# Patient Record
Sex: FEMALE | Race: WHITE | Employment: UNEMPLOYED | URBAN - METROPOLITAN AREA
[De-identification: names, ages, dates, MRNs, and addresses within clinical notes are randomized per-mention and may not be internally consistent; named-entity substitution may affect disease eponyms.]

---

## 2017-01-26 NOTE — PROGRESS NOTES
Here in follow-up for attention deficit disorder. Adderall working well. Denies palpitations or poor appetite. She is working on losing weight. Congratulated her on 15 pounds of weight loss in the last 16 months.   She also needs a refill of amitriptyli °C) (Oral)  Resp 16  Ht 68.5\"  Wt 174 lb 9.6 oz  BMI 26.16 kg/m2  SpO2 99%  LMP 01/02/2017 (Exact Date)    General thin female no acute distress. Heart regular rhythm normal S1-S2 no murmurs lifts or gallops.     Assessment ADD #2 dysthymia    Plans refil

## 2017-05-12 NOTE — PROGRESS NOTES
Here for follow-up of ADD. She feels like the medicine is not working as well as it once did. She is interested in increasing the dose. She denies heart palpitations, insomnia, or poor appetite. Her periods have been irregular the last few months.   A

## 2017-06-06 ENCOUNTER — PATIENT MESSAGE (OUTPATIENT)
Dept: FAMILY MEDICINE CLINIC | Facility: CLINIC | Age: 47
End: 2017-06-06

## 2017-06-06 NOTE — TELEPHONE ENCOUNTER
From: Ginger Bosch  To: Usha Shen MD  Sent: 6/6/2017 9:04 AM CDT  Subject: Prescription Question    Good morning,  I see that my prescription of adderall has a fill date of  6/11. We leave for vacation on Saturday 6/10 very early.   Can I please ge

## 2017-06-06 NOTE — TELEPHONE ENCOUNTER
From: Ofelia Montero  To: Ryder Morrell MD  Sent: 6/6/2017 1:53 PM CDT  Subject: Prescription Question    Thanks for getting back to me. I have tried one day early in the  past, and they wouldn't fill it.  They are quite finicky about  adderall scripts,

## 2017-06-08 NOTE — TELEPHONE ENCOUNTER
Pt here as walk in. Has Adderall Rx for Jun 11, 2017 but going out of town and needs to fill a few days early. New Rx pended for approval to start today so pt can fill before going out of town.

## 2017-08-10 ENCOUNTER — PATIENT MESSAGE (OUTPATIENT)
Dept: FAMILY MEDICINE CLINIC | Facility: CLINIC | Age: 47
End: 2017-08-10

## 2017-08-10 RX ORDER — DEXTROAMPHETAMINE SACCHARATE, AMPHETAMINE ASPARTATE, DEXTROAMPHETAMINE SULFATE AND AMPHETAMINE SULFATE 5; 5; 5; 5 MG/1; MG/1; MG/1; MG/1
20 TABLET ORAL 2 TIMES DAILY
Qty: 60 TABLET | Refills: 0 | Status: SHIPPED | OUTPATIENT
Start: 2017-08-10 | End: 2017-09-09

## 2017-08-10 RX ORDER — DEXTROAMPHETAMINE SACCHARATE, AMPHETAMINE ASPARTATE, DEXTROAMPHETAMINE SULFATE AND AMPHETAMINE SULFATE 5; 5; 5; 5 MG/1; MG/1; MG/1; MG/1
20 TABLET ORAL 2 TIMES DAILY
Qty: 60 TABLET | Refills: 0 | Status: SHIPPED | OUTPATIENT
Start: 2017-10-09 | End: 2017-11-03

## 2017-08-10 RX ORDER — DEXTROAMPHETAMINE SACCHARATE, AMPHETAMINE ASPARTATE, DEXTROAMPHETAMINE SULFATE AND AMPHETAMINE SULFATE 5; 5; 5; 5 MG/1; MG/1; MG/1; MG/1
20 TABLET ORAL 2 TIMES DAILY
Qty: 60 TABLET | Refills: 0 | Status: SHIPPED | OUTPATIENT
Start: 2017-09-09 | End: 2017-10-09

## 2017-08-10 NOTE — TELEPHONE ENCOUNTER
----- Message from [address] sent at 8/10/2017  8:52 AM CDT -----  Regarding: Prescription Question  Contact: [phone]  My prescription of adderall should be refilled on August 9th.  My appointment to review and receive my next three  months of

## 2017-08-10 NOTE — TELEPHONE ENCOUNTER
Approve/Deny. Last OV 5-12-17 RC ADD with request to follow up in 6 months. Envelope at [facility]. Called pt, advised on [facility] request for 6 month follow up, cancelled 8-15-17 appointment.

## 2017-09-19 ENCOUNTER — CHARTING TRANS (OUTPATIENT)
Dept: OTHER | Age: 47
End: 2017-09-19

## 2017-09-19 ENCOUNTER — LAB SERVICES (OUTPATIENT)
Dept: OTHER | Age: 47
End: 2017-09-19

## 2017-09-19 LAB — RAPID STREP GROUP A: NORMAL

## 2017-09-28 ENCOUNTER — TELEPHONE (OUTPATIENT)
Dept: OBGYN CLINIC | Facility: CLINIC | Age: 47
End: 2017-09-28

## 2017-11-03 NOTE — PROGRESS NOTES
Here to follow-up ADD adults. Denies heart palpitations. Thinks the medicine could work a little better and seems to have decreased effectiveness from last month, but not willing to change dose at this time.     PAST MEDICAL HISTORY:  Past Medical History

## 2018-02-01 ENCOUNTER — PATIENT MESSAGE (OUTPATIENT)
Dept: FAMILY MEDICINE CLINIC | Facility: CLINIC | Age: 48
End: 2018-02-01

## 2018-02-01 RX ORDER — DEXTROAMPHETAMINE SACCHARATE, AMPHETAMINE ASPARTATE, DEXTROAMPHETAMINE SULFATE AND AMPHETAMINE SULFATE 5; 5; 5; 5 MG/1; MG/1; MG/1; MG/1
20 TABLET ORAL 2 TIMES DAILY
Qty: 60 TABLET | Refills: 0 | Status: SHIPPED | OUTPATIENT
Start: 2018-02-01 | End: 2018-04-25

## 2018-02-01 RX ORDER — DEXTROAMPHETAMINE SACCHARATE, AMPHETAMINE ASPARTATE, DEXTROAMPHETAMINE SULFATE AND AMPHETAMINE SULFATE 5; 5; 5; 5 MG/1; MG/1; MG/1; MG/1
20 TABLET ORAL 2 TIMES DAILY
Qty: 60 TABLET | Refills: 0 | Status: SHIPPED | OUTPATIENT
Start: 2018-03-01 | End: 2018-04-25

## 2018-02-01 RX ORDER — DEXTROAMPHETAMINE SACCHARATE, AMPHETAMINE ASPARTATE, DEXTROAMPHETAMINE SULFATE AND AMPHETAMINE SULFATE 5; 5; 5; 5 MG/1; MG/1; MG/1; MG/1
20 TABLET ORAL 2 TIMES DAILY
Qty: 60 TABLET | Refills: 0 | Status: SHIPPED | OUTPATIENT
Start: 2018-04-01 | End: 2018-04-25

## 2018-02-01 NOTE — TELEPHONE ENCOUNTER
Patient calling, she needs a refill on her adderall. It will be up on the 9th.        Please call patient with questions or when ready for

## 2018-02-01 NOTE — TELEPHONE ENCOUNTER
From: Carly Rodriguez  To: Alban Kiser MD  Sent: 2/1/2018 9:08 AM CST  Subject: Prescription Question    Hi Dr. Moses Pond,  I am due to refill my adderal on February 9th for another  three months.  Due to my constant struggle to shed some weight,   I am w

## 2018-02-05 DIAGNOSIS — F34.1 DYSTHYMIA: ICD-10-CM

## 2018-02-05 RX ORDER — AMITRIPTYLINE HYDROCHLORIDE 10 MG/1
TABLET, FILM COATED ORAL
Qty: 180 TABLET | Refills: 0 | Status: SHIPPED | OUTPATIENT
Start: 2018-02-05 | End: 2018-04-25

## 2018-04-25 PROBLEM — N92.1 MENORRHAGIA WITH IRREGULAR CYCLE: Status: ACTIVE | Noted: 2018-04-25

## 2018-04-25 NOTE — PROGRESS NOTES
Here in 6 months follow-up for ADD. Taking Adderall 20 mg twice a day with good success. Denies heart palpitations, insomnia, or poor appetite. Felt like her depression was slipping a little.   One month ago started taking 3 tablets per night or 30 mg Antibiotics  FAMILY HISTORY  Family History   Problem Relation Age of Onset   • Diabetes Father    • Diabetes Mother    • ADHD Son    • ADHD Sister    • ADHD Brother    • ADHD Other        PHYSICAL EXAM:  /80   Pulse 110   Temp 98.7 °F (37.1 °C) (Ora

## 2018-04-26 ENCOUNTER — LAB ENCOUNTER (OUTPATIENT)
Dept: LAB | Age: 48
End: 2018-04-26
Attending: FAMILY MEDICINE
Payer: COMMERCIAL

## 2018-04-26 DIAGNOSIS — N92.1 MENORRHAGIA WITH IRREGULAR CYCLE: ICD-10-CM

## 2018-04-26 PROCEDURE — 85025 COMPLETE CBC W/AUTO DIFF WBC: CPT

## 2018-04-26 PROCEDURE — 36415 COLL VENOUS BLD VENIPUNCTURE: CPT

## 2018-04-26 PROCEDURE — 84443 ASSAY THYROID STIM HORMONE: CPT

## 2018-04-27 ENCOUNTER — TELEPHONE (OUTPATIENT)
Dept: FAMILY MEDICINE CLINIC | Facility: CLINIC | Age: 48
End: 2018-04-27

## 2018-05-07 ENCOUNTER — PATIENT MESSAGE (OUTPATIENT)
Dept: FAMILY MEDICINE CLINIC | Facility: CLINIC | Age: 48
End: 2018-05-07

## 2018-05-07 RX ORDER — DEXTROAMPHETAMINE SACCHARATE, AMPHETAMINE ASPARTATE, DEXTROAMPHETAMINE SULFATE AND AMPHETAMINE SULFATE 5; 5; 5; 5 MG/1; MG/1; MG/1; MG/1
20 TABLET ORAL 2 TIMES DAILY
Qty: 60 TABLET | Refills: 0 | Status: SHIPPED | OUTPATIENT
Start: 2018-05-19 | End: 2018-06-18

## 2018-05-07 RX ORDER — DEXTROAMPHETAMINE SACCHARATE, AMPHETAMINE ASPARTATE, DEXTROAMPHETAMINE SULFATE AND AMPHETAMINE SULFATE 5; 5; 5; 5 MG/1; MG/1; MG/1; MG/1
20 TABLET ORAL 2 TIMES DAILY
Qty: 60 TABLET | Refills: 0 | Status: SHIPPED | OUTPATIENT
Start: 2018-06-19 | End: 2018-07-19

## 2018-05-07 NOTE — TELEPHONE ENCOUNTER
From: Jana Her  To: Pedro Zhou MD  Sent: 5/7/2018 9:52 AM CDT  Subject: Prescription Question    I was given scripts for adderal for the next three months. I dropped off the first on May 5th to be filled.  Noticed that  All three scripts are f

## 2018-05-07 NOTE — TELEPHONE ENCOUNTER
Pt states she takes Adderall BID,  Last scripts given were for qd. Pt notified to bring back previous scripts to replace. Envelope with roomer.

## 2018-05-23 ENCOUNTER — TELEPHONE (OUTPATIENT)
Dept: FAMILY MEDICINE CLINIC | Facility: CLINIC | Age: 48
End: 2018-05-23

## 2018-05-23 NOTE — TELEPHONE ENCOUNTER
Spoke to rep for PA and she is showing claims as paid for the next 4 months. No PA required.  Message sent to pt through [address] [place] [address] [address]

## 2018-05-24 ENCOUNTER — OFFICE VISIT (OUTPATIENT)
Dept: OBGYN CLINIC | Facility: CLINIC | Age: 48
End: 2018-05-24

## 2018-05-24 VITALS
SYSTOLIC BLOOD PRESSURE: 138 MMHG | DIASTOLIC BLOOD PRESSURE: 88 MMHG | BODY MASS INDEX: 26.98 KG/M2 | HEIGHT: 68 IN | WEIGHT: 178 LBS

## 2018-05-24 DIAGNOSIS — Z12.4 CERVICAL CANCER SCREENING: ICD-10-CM

## 2018-05-24 DIAGNOSIS — N93.9 ABNORMAL UTERINE BLEEDING (AUB): Primary | ICD-10-CM

## 2018-05-24 PROCEDURE — 99204 OFFICE O/P NEW MOD 45 MIN: CPT | Performed by: OBSTETRICS & GYNECOLOGY

## 2018-05-24 RX ORDER — MISOPROSTOL 200 UG/1
200 TABLET ORAL 2 TIMES DAILY
Qty: 2 TABLET | Refills: 0 | Status: SHIPPED | OUTPATIENT
Start: 2018-05-24 | End: 2018-11-01

## 2018-05-24 NOTE — PROGRESS NOTES
[facility]  Obstetrics and Gynecology Referral  History & Physical    CC: Patient is a new patient and referred for AUB    Subjective:     HPI: Marcina Goodell is a 51year old  female referred for AUB.  Patient reports regular menses monthl No current facility-administered medications on file prior to visit.      All:    Sulfa Antibiotics       NAUSEA AND VOMITING    PMH:  Past Medical History:   Diagnosis Date   • Depression    • Elevated sedimentation rate 1/21/2014   • Microcytic anemia 1/2 VAGINA: normal appearing vagina with normal color and discharge, no lesions  CERVIX: normal appearing cervix without discharge or lesions  UTERUS: uterus is normal size, shape, consistency and nontender  ADNEXA: normal adnexa in size, nontender and no mass

## 2018-05-25 PROBLEM — N93.9 ABNORMAL UTERINE BLEEDING (AUB): Status: ACTIVE | Noted: 2018-05-25

## 2018-05-30 NOTE — PROGRESS NOTES
Pap smear normal and negative for HPV. Repeat pap smear in 5 years per ASCCP guidelines. Continue annual gynecologic exams. Please notify patient.

## 2018-06-08 ENCOUNTER — HOSPITAL ENCOUNTER (OUTPATIENT)
Dept: ULTRASOUND IMAGING | Age: 48
Discharge: HOME | End: 2018-06-08
Attending: OBSTETRICS & GYNECOLOGY
Payer: COMMERCIAL

## 2018-06-08 DIAGNOSIS — N93.9 ABNORMAL UTERINE BLEEDING (AUB): ICD-10-CM

## 2018-06-08 PROCEDURE — 76830 TRANSVAGINAL US NON-OB: CPT | Performed by: OBSTETRICS & GYNECOLOGY

## 2018-06-08 PROCEDURE — 76856 US EXAM PELVIC COMPLETE: CPT | Performed by: OBSTETRICS & GYNECOLOGY

## 2018-06-10 NOTE — PROGRESS NOTES
Pelvic US noted for uterine fibroid measuring 5.2 x 4.7 x 4.8 cm and normal endometrial stripe  F/u 06/12/18 for EMB  [employer] message sent

## 2018-06-12 ENCOUNTER — TELEPHONE (OUTPATIENT)
Dept: OBGYN CLINIC | Facility: CLINIC | Age: 48
End: 2018-06-12

## 2018-06-12 NOTE — TELEPHONE ENCOUNTER
Patient is scheduled for EMB today. She started spotting, states it is very light. OK to keep appointment as scheduled? Please advise.

## 2018-06-12 NOTE — TELEPHONE ENCOUNTER
Patient informed. Verbalized understanding. She stated it is more than spotting. Advised patient she will need to reschedule. Call transferred to [facility] to reschedule.

## 2018-06-12 NOTE — TELEPHONE ENCOUNTER
PT scheduled for an EMB and has slight bleeding from an unexpected cycle  Should PT reschedule EMB for today

## 2018-07-15 ENCOUNTER — PATIENT MESSAGE (OUTPATIENT)
Dept: FAMILY MEDICINE CLINIC | Facility: CLINIC | Age: 48
End: 2018-07-15

## 2018-07-16 RX ORDER — DEXTROAMPHETAMINE SACCHARATE, AMPHETAMINE ASPARTATE, DEXTROAMPHETAMINE SULFATE AND AMPHETAMINE SULFATE 5; 5; 5; 5 MG/1; MG/1; MG/1; MG/1
20 TABLET ORAL 2 TIMES DAILY
Qty: 60 TABLET | Refills: 0 | Status: SHIPPED | OUTPATIENT
Start: 2018-09-14 | End: 2018-10-08

## 2018-07-16 RX ORDER — DEXTROAMPHETAMINE SACCHARATE, AMPHETAMINE ASPARTATE, DEXTROAMPHETAMINE SULFATE AND AMPHETAMINE SULFATE 5; 5; 5; 5 MG/1; MG/1; MG/1; MG/1
20 TABLET ORAL 2 TIMES DAILY
Qty: 60 TABLET | Refills: 0 | Status: SHIPPED | OUTPATIENT
Start: 2018-07-16 | End: 2018-08-15

## 2018-07-16 RX ORDER — DEXTROAMPHETAMINE SACCHARATE, AMPHETAMINE ASPARTATE, DEXTROAMPHETAMINE SULFATE AND AMPHETAMINE SULFATE 5; 5; 5; 5 MG/1; MG/1; MG/1; MG/1
20 TABLET ORAL 2 TIMES DAILY
Qty: 60 TABLET | Refills: 0 | Status: SHIPPED | OUTPATIENT
Start: 2018-08-15 | End: 2018-09-14

## 2018-07-16 NOTE — TELEPHONE ENCOUNTER
Dr. Olu Parnell,  See request below.    Last refill 4/25/18    Last o.v.  4/25/18    Medication pended

## 2018-07-16 NOTE — TELEPHONE ENCOUNTER
From: Darwin Booth  To: Laury Rivera MD  Sent: 7/15/2018 11:33 AM CDT  Subject: Prescription Question    I am due to refill my adderal script on July 18th. I am  currently out of scripts.  Can I  3 more scripts  on Monday, or would Dr. Susan Mueller

## 2018-07-17 ENCOUNTER — PATIENT MESSAGE (OUTPATIENT)
Dept: FAMILY MEDICINE CLINIC | Facility: CLINIC | Age: 48
End: 2018-07-17

## 2018-07-17 NOTE — TELEPHONE ENCOUNTER
From: Sisi Hawley  To: Randi Skiff, MD  Sent: 7/17/2018 8:27 AM CDT  Subject: Prescription Question    Good morning,  Still waiting on a response to message sent on July 15.   Thanks,  Arlene Doe

## 2018-10-07 DIAGNOSIS — F34.1 DYSTHYMIA: ICD-10-CM

## 2018-10-08 RX ORDER — DEXTROAMPHETAMINE SACCHARATE, AMPHETAMINE ASPARTATE, DEXTROAMPHETAMINE SULFATE AND AMPHETAMINE SULFATE 5; 5; 5; 5 MG/1; MG/1; MG/1; MG/1
20 TABLET ORAL 2 TIMES DAILY
Qty: 60 TABLET | Refills: 0 | Status: SHIPPED | OUTPATIENT
Start: 2018-10-08 | End: 2018-10-10

## 2018-10-08 NOTE — TELEPHONE ENCOUNTER
Patient is requesting her Adderall refill - she will be out as of Saturday. She was due for a 6 mos follow up. She did schedule an appointment with Dr Marjorie Arauz on Wed 10/10 to renew.   She wanted to know if CZ would just renew for one month and she would then

## 2018-10-09 RX ORDER — AMITRIPTYLINE HYDROCHLORIDE 10 MG/1
TABLET, FILM COATED ORAL
Qty: 270 TABLET | Refills: 0 | Status: SHIPPED | OUTPATIENT
Start: 2018-10-09 | End: 2019-01-08

## 2018-10-10 RX ORDER — DEXTROAMPHETAMINE SACCHARATE, AMPHETAMINE ASPARTATE, DEXTROAMPHETAMINE SULFATE AND AMPHETAMINE SULFATE 5; 5; 5; 5 MG/1; MG/1; MG/1; MG/1
20 TABLET ORAL 2 TIMES DAILY
Qty: 60 TABLET | Refills: 0 | Status: SHIPPED | OUTPATIENT
Start: 2018-10-10 | End: 2018-11-06

## 2018-11-01 ENCOUNTER — OFFICE VISIT (OUTPATIENT)
Dept: OBGYN CLINIC | Facility: CLINIC | Age: 48
End: 2018-11-01
Payer: COMMERCIAL

## 2018-11-01 VITALS
SYSTOLIC BLOOD PRESSURE: 120 MMHG | WEIGHT: 178 LBS | HEIGHT: 70 IN | DIASTOLIC BLOOD PRESSURE: 74 MMHG | BODY MASS INDEX: 25.48 KG/M2

## 2018-11-01 DIAGNOSIS — N92.1 MENORRHAGIA WITH IRREGULAR CYCLE: ICD-10-CM

## 2018-11-01 DIAGNOSIS — Z01.812 PRE-PROCEDURE LAB EXAM: ICD-10-CM

## 2018-11-01 DIAGNOSIS — N93.9 ABNORMAL UTERINE BLEEDING (AUB): Primary | ICD-10-CM

## 2018-11-01 PROCEDURE — 58100 BIOPSY OF UTERUS LINING: CPT | Performed by: OBSTETRICS & GYNECOLOGY

## 2018-11-01 PROCEDURE — 81025 URINE PREGNANCY TEST: CPT | Performed by: OBSTETRICS & GYNECOLOGY

## 2018-11-01 RX ORDER — DAPSONE 75 MG/G
GEL TOPICAL
Refills: 3 | Status: ON HOLD | COMMUNITY
Start: 2018-08-21 | End: 2019-01-15

## 2018-11-01 NOTE — PROGRESS NOTES
Discussion held with the patient regarding findings and symptomatic uterine fibroids including conservative versus medical versus surgical management. Discussion held with the patient regarding medical management including Mirena IUD and OCP.  Discussion he

## 2018-11-01 NOTE — PROCEDURES
Procedure: Endometrial biopsy     Date of Procedure: 11/01/18    Pre-procedure diagnosis:  AUB  Fibroids, intramural  Menorrhagia with irregular cycle    Post-procedure diagnosis:   AUB  Fibroids, intramural  Menorrhagia with irregular cycle    Indications

## 2018-11-02 VITALS
RESPIRATION RATE: 16 BRPM | HEART RATE: 86 BPM | BODY MASS INDEX: 24.34 KG/M2 | HEIGHT: 70 IN | WEIGHT: 170 LBS | SYSTOLIC BLOOD PRESSURE: 120 MMHG | TEMPERATURE: 98.6 F | DIASTOLIC BLOOD PRESSURE: 82 MMHG

## 2018-11-06 NOTE — PROGRESS NOTES
Here with follow-up of depression and ADD. She is considering changing her depression medication. She does admit to a little dry mouth with the amitriptyline. She is currently taking 30 mg at night.     ADD is well controlled on Adderall 20 mg twice a da adult ADD #2 depression    Plans Adderall prescribed for 3 months. Amitriptyline prescription recently picked up for 3 months. I will see her back in 3 months. No medication changes were made today.

## 2018-11-07 ENCOUNTER — OFFICE VISIT (OUTPATIENT)
Dept: OBGYN CLINIC | Facility: CLINIC | Age: 48
End: 2018-11-07
Payer: COMMERCIAL

## 2018-11-07 ENCOUNTER — APPOINTMENT (OUTPATIENT)
Dept: LAB | Age: 48
End: 2018-11-07
Attending: OBSTETRICS & GYNECOLOGY
Payer: COMMERCIAL

## 2018-11-07 VITALS
DIASTOLIC BLOOD PRESSURE: 72 MMHG | SYSTOLIC BLOOD PRESSURE: 142 MMHG | BODY MASS INDEX: 25.48 KG/M2 | HEIGHT: 70 IN | WEIGHT: 178 LBS | HEART RATE: 116 BPM

## 2018-11-07 DIAGNOSIS — N92.1 MENORRHAGIA WITH IRREGULAR CYCLE: ICD-10-CM

## 2018-11-07 DIAGNOSIS — D25.1 INTRAMURAL LEIOMYOMA OF UTERUS: ICD-10-CM

## 2018-11-07 DIAGNOSIS — D50.8 OTHER IRON DEFICIENCY ANEMIA: Primary | ICD-10-CM

## 2018-11-07 DIAGNOSIS — Z23 NEED FOR VACCINATION: ICD-10-CM

## 2018-11-07 DIAGNOSIS — N93.9 ABNORMAL UTERINE BLEEDING (AUB): ICD-10-CM

## 2018-11-07 PROCEDURE — 90686 IIV4 VACC NO PRSV 0.5 ML IM: CPT | Performed by: OBSTETRICS & GYNECOLOGY

## 2018-11-07 PROCEDURE — 85025 COMPLETE CBC W/AUTO DIFF WBC: CPT | Performed by: OBSTETRICS & GYNECOLOGY

## 2018-11-07 PROCEDURE — 36415 COLL VENOUS BLD VENIPUNCTURE: CPT | Performed by: OBSTETRICS & GYNECOLOGY

## 2018-11-07 PROCEDURE — 99213 OFFICE O/P EST LOW 20 MIN: CPT | Performed by: OBSTETRICS & GYNECOLOGY

## 2018-11-07 PROCEDURE — 90471 IMMUNIZATION ADMIN: CPT | Performed by: OBSTETRICS & GYNECOLOGY

## 2018-11-07 NOTE — PROGRESS NOTES
Please inform patient to continue supplemental iron twice daily. Hgb improving.    Will submit form for surgery for VARGHESE MITCHELL.

## 2018-11-07 NOTE — PATIENT INSTRUCTIONS
Please return if having abnormal vaginal bleeding or severe pelvic pain    You are will be scheduled for a total laparoscopic hysterectomy with bilateral salpingectomy (removal of uterus and both fallopian tubes through small incisions made along the abdom

## 2018-11-08 NOTE — PROGRESS NOTES
[address] [facility]  Obstetrics and Gynecology  Follow Up Progress Note    Subjective:     Darwin Booth is a 51year old  female who was last seen in office 18 and presents with c/o AUB, menorrhagia with regular cycles, anemia and uterine f fluid.  RIGHT OVARY:  4.3 x 3.1 x 3.2 cm. Within the limits of normal.  LEFT OVARY:  3.2 x 3.6 x 2.5 cm. Within the limits of normal.  CUL-DE-SAC:  No abnormal fluid. OTHER:  None.  =====  CONCLUSION:  Fundal mass consistent with a fibroid.        Assess - advise to repeat CBC to monitor progress of iron supplementation   - advised to obtain medical clearance prior to procedure   - pt informed that she will be called to schedule surgery      All of the findings and plan were discussed with the patient.

## 2018-11-13 ENCOUNTER — TELEPHONE (OUTPATIENT)
Dept: OBGYN CLINIC | Facility: CLINIC | Age: 48
End: 2018-11-13

## 2018-11-13 DIAGNOSIS — N93.9 ABNORMAL UTERINE BLEEDING: Primary | ICD-10-CM

## 2018-11-13 DIAGNOSIS — D25.9 UTERINE LEIOMYOMA, UNSPECIFIED LOCATION: ICD-10-CM

## 2018-11-13 DIAGNOSIS — D64.9 ANEMIA, UNSPECIFIED TYPE: ICD-10-CM

## 2018-11-13 DIAGNOSIS — N92.0 MENORRHAGIA WITH REGULAR CYCLE: ICD-10-CM

## 2018-11-13 NOTE — TELEPHONE ENCOUNTER
----- Message from Bre Hampton MD sent at 11/7/2018  8:04 PM CST -----  Regarding: please schedule surgery   Surgeon: Dr. Bre Hampton   Assistant: Yes (Dr. Jimmy Oneill only for this case)     Type of Admit: Surgery Admit Inpatient    Procedure Location:

## 2018-11-13 NOTE — TELEPHONE ENCOUNTER
Left message on answering machine to call back. Available date/times as of today; 1/15/18 at 0730 or 1/25/18 at 0730.

## 2018-11-14 NOTE — TELEPHONE ENCOUNTER
Surgery scheduled for 1/15/19 at 0730. Patient notified. Patient verbalized understanding. Surgery order entered; added to calendar and office hrs adjusted as needed. Pre and post op appts scheduled.   Pt advised to have pre op physical with PCP as well

## 2018-11-27 NOTE — TELEPHONE ENCOUNTER
Call to [facility] for PA for surgery; spoke with Ramírez HAWKINS.  CPT codes , 58768, 09416 and 46603 require pre cert for inpatient admission.   Clinicals to be faxed to [phone]; attention pre certification  Ref number [id number]  Clinicals faxed; hold for a

## 2018-12-13 ENCOUNTER — TELEPHONE (OUTPATIENT)
Dept: OBGYN CLINIC | Facility: CLINIC | Age: 48
End: 2018-12-13

## 2018-12-17 NOTE — TELEPHONE ENCOUNTER
Surgery authorized but inpatient stay denied. If pt requires more than observation stay, hospital will need to get authorization. See TE dated 12/13/18.

## 2019-01-02 ENCOUNTER — OFFICE VISIT (OUTPATIENT)
Dept: OBGYN CLINIC | Facility: CLINIC | Age: 49
End: 2019-01-02
Payer: COMMERCIAL

## 2019-01-02 VITALS
SYSTOLIC BLOOD PRESSURE: 122 MMHG | WEIGHT: 181.38 LBS | DIASTOLIC BLOOD PRESSURE: 80 MMHG | BODY MASS INDEX: 25.97 KG/M2 | HEART RATE: 103 BPM | HEIGHT: 70 IN

## 2019-01-02 DIAGNOSIS — N93.9 ABNORMAL UTERINE BLEEDING (AUB): Primary | ICD-10-CM

## 2019-01-02 DIAGNOSIS — D25.1 INTRAMURAL LEIOMYOMA OF UTERUS: ICD-10-CM

## 2019-01-02 DIAGNOSIS — N92.1 MENORRHAGIA WITH IRREGULAR CYCLE: ICD-10-CM

## 2019-01-02 PROCEDURE — 99213 OFFICE O/P EST LOW 20 MIN: CPT | Performed by: OBSTETRICS & GYNECOLOGY

## 2019-01-02 NOTE — PATIENT INSTRUCTIONS
Please return if having abnormal vaginal bleeding or severe pelvic pain    You are are scheduled for a robotic assisted laparoscopic hysterectomy with bilateral salpingectomy (removal of uterus and both fallopian tubes through small incisions made along th

## 2019-01-03 NOTE — PROGRESS NOTES
[facility]  Obstetrics and Gynecology  Follow Up Progress Note    Subjective:     Danish Hay is a 51year old  female who presents for pre operative visit.  The patient is scheduled for robotic assisted laparoscopic hysterectomy, franky consistent with a fibroid.      Assessment:     Sisi Hawley is a 51year old  female who presents for pre operative visit    Patient Active Problem List:     Degeneration of lumbar or lumbosacral intervertebral disc     Other acne     Depression ability at this time.     RTC in following the procedure or sooner if needed     Nik Davis MD   [facility] - OBGYN

## 2019-01-08 ENCOUNTER — APPOINTMENT (OUTPATIENT)
Dept: LAB | Age: 49
End: 2019-01-08
Attending: FAMILY MEDICINE
Payer: COMMERCIAL

## 2019-01-08 ENCOUNTER — OFFICE VISIT (OUTPATIENT)
Dept: FAMILY MEDICINE CLINIC | Facility: CLINIC | Age: 49
End: 2019-01-08
Payer: COMMERCIAL

## 2019-01-08 VITALS
WEIGHT: 180 LBS | SYSTOLIC BLOOD PRESSURE: 130 MMHG | DIASTOLIC BLOOD PRESSURE: 80 MMHG | HEART RATE: 124 BPM | RESPIRATION RATE: 18 BRPM | OXYGEN SATURATION: 98 % | HEIGHT: 70 IN | BODY MASS INDEX: 25.77 KG/M2

## 2019-01-08 DIAGNOSIS — F98.8 ATTENTION DEFICIT DISORDER (ADD) WITHOUT HYPERACTIVITY: ICD-10-CM

## 2019-01-08 DIAGNOSIS — N93.8 DYSFUNCTIONAL UTERINE BLEEDING: ICD-10-CM

## 2019-01-08 DIAGNOSIS — Z01.818 PREOP EXAMINATION: Primary | ICD-10-CM

## 2019-01-08 DIAGNOSIS — F34.1 DYSTHYMIA: ICD-10-CM

## 2019-01-08 LAB
ALBUMIN SERPL-MCNC: 3.8 G/DL (ref 3.1–4.5)
ALBUMIN/GLOB SERPL: 1 {RATIO} (ref 1–2)
ALP LIVER SERPL-CCNC: 51 U/L (ref 39–100)
ALT SERPL-CCNC: <6 U/L (ref 14–54)
ANION GAP SERPL CALC-SCNC: 5 MMOL/L (ref 0–18)
AST SERPL-CCNC: 20 U/L (ref 15–41)
BASOPHILS # BLD AUTO: 0.03 X10(3) UL (ref 0–0.1)
BASOPHILS NFR BLD AUTO: 0.3 %
BILIRUB SERPL-MCNC: 0.2 MG/DL (ref 0.1–2)
BILIRUB UR QL STRIP.AUTO: NEGATIVE
BUN BLD-MCNC: 16 MG/DL (ref 8–20)
BUN/CREAT SERPL: 30.8 (ref 10–20)
CALCIUM BLD-MCNC: 9.4 MG/DL (ref 8.3–10.3)
CHLORIDE SERPL-SCNC: 103 MMOL/L (ref 101–111)
CO2 SERPL-SCNC: 30 MMOL/L (ref 22–32)
CREAT BLD-MCNC: 0.52 MG/DL (ref 0.55–1.02)
EOSINOPHIL # BLD AUTO: 0.07 X10(3) UL (ref 0–0.3)
EOSINOPHIL NFR BLD AUTO: 0.8 %
ERYTHROCYTE [DISTWIDTH] IN BLOOD BY AUTOMATED COUNT: 16.9 % (ref 11.5–16)
GLOBULIN PLAS-MCNC: 3.8 G/DL (ref 2.8–4.4)
GLUCOSE BLD-MCNC: 100 MG/DL (ref 70–99)
GLUCOSE UR STRIP.AUTO-MCNC: NEGATIVE MG/DL
HCT VFR BLD AUTO: 33.7 % (ref 34–50)
HGB BLD-MCNC: 9.9 G/DL (ref 12–16)
IMMATURE GRANULOCYTE COUNT: 0.02 X10(3) UL (ref 0–1)
IMMATURE GRANULOCYTE RATIO %: 0.2 %
KETONES UR STRIP.AUTO-MCNC: NEGATIVE MG/DL
LYMPHOCYTES # BLD AUTO: 1.52 X10(3) UL (ref 0.9–4)
LYMPHOCYTES NFR BLD AUTO: 16.9 %
M PROTEIN MFR SERPL ELPH: 7.6 G/DL (ref 6.4–8.2)
MCH RBC QN AUTO: 24.7 PG (ref 27–33.2)
MCHC RBC AUTO-ENTMCNC: 29.4 G/DL (ref 31–37)
MCV RBC AUTO: 84 FL (ref 81–100)
MONOCYTES # BLD AUTO: 0.42 X10(3) UL (ref 0.1–1)
MONOCYTES NFR BLD AUTO: 4.7 %
NEUTROPHIL ABS PRELIM: 6.91 X10 (3) UL (ref 1.3–6.7)
NEUTROPHILS # BLD AUTO: 6.91 X10(3) UL (ref 1.3–6.7)
NEUTROPHILS NFR BLD AUTO: 77.1 %
NITRITE UR QL STRIP.AUTO: NEGATIVE
OSMOLALITY SERPL CALC.SUM OF ELEC: 287 MOSM/KG (ref 275–295)
PH UR STRIP.AUTO: 6 [PH] (ref 4.5–8)
PLATELET # BLD AUTO: 431 10(3)UL (ref 150–450)
POTASSIUM SERPL-SCNC: 4 MMOL/L (ref 3.6–5.1)
PROT UR STRIP.AUTO-MCNC: 100 MG/DL
RBC # BLD AUTO: 4.01 X10(6)UL (ref 3.8–5.1)
RBC #/AREA URNS AUTO: >10 /HPF
RED CELL DISTRIBUTION WIDTH-SD: 51.8 FL (ref 35.1–46.3)
SODIUM SERPL-SCNC: 138 MMOL/L (ref 136–144)
SP GR UR STRIP.AUTO: 1.01 (ref 1–1.03)
UROBILINOGEN UR STRIP.AUTO-MCNC: <2 MG/DL
WBC # BLD AUTO: 9 X10(3) UL (ref 4–13)
WBC #/AREA URNS AUTO: >50 /HPF
WBC CLUMPS UR QL AUTO: PRESENT

## 2019-01-08 PROCEDURE — 81001 URINALYSIS AUTO W/SCOPE: CPT | Performed by: FAMILY MEDICINE

## 2019-01-08 PROCEDURE — 80053 COMPREHEN METABOLIC PANEL: CPT | Performed by: FAMILY MEDICINE

## 2019-01-08 PROCEDURE — 36415 COLL VENOUS BLD VENIPUNCTURE: CPT | Performed by: FAMILY MEDICINE

## 2019-01-08 PROCEDURE — 85025 COMPLETE CBC W/AUTO DIFF WBC: CPT | Performed by: FAMILY MEDICINE

## 2019-01-08 PROCEDURE — 99242 OFF/OP CONSLTJ NEW/EST SF 20: CPT | Performed by: FAMILY MEDICINE

## 2019-01-08 RX ORDER — AMITRIPTYLINE HYDROCHLORIDE 10 MG/1
40 TABLET, FILM COATED ORAL NIGHTLY
Qty: 360 TABLET | Refills: 1 | Status: SHIPPED | OUTPATIENT
Start: 2019-01-08 | End: 2019-07-10

## 2019-01-08 RX ORDER — DEXTROAMPHETAMINE SACCHARATE, AMPHETAMINE ASPARTATE, DEXTROAMPHETAMINE SULFATE AND AMPHETAMINE SULFATE 5; 5; 5; 5 MG/1; MG/1; MG/1; MG/1
20 TABLET ORAL 2 TIMES DAILY
Qty: 60 TABLET | Refills: 0 | Status: ON HOLD | OUTPATIENT
Start: 2019-04-03 | End: 2019-01-15

## 2019-01-08 RX ORDER — DEXTROAMPHETAMINE SACCHARATE, AMPHETAMINE ASPARTATE, DEXTROAMPHETAMINE SULFATE AND AMPHETAMINE SULFATE 5; 5; 5; 5 MG/1; MG/1; MG/1; MG/1
20 TABLET ORAL 2 TIMES DAILY
Qty: 60 TABLET | Refills: 0 | Status: ON HOLD | OUTPATIENT
Start: 2019-02-04 | End: 2019-01-15

## 2019-01-08 RX ORDER — DEXTROAMPHETAMINE SACCHARATE, AMPHETAMINE ASPARTATE, DEXTROAMPHETAMINE SULFATE AND AMPHETAMINE SULFATE 5; 5; 5; 5 MG/1; MG/1; MG/1; MG/1
20 TABLET ORAL 2 TIMES DAILY
Qty: 60 TABLET | Refills: 0 | Status: SHIPPED | OUTPATIENT
Start: 2019-03-04 | End: 2019-04-03

## 2019-01-08 NOTE — H&P
HPI:  Here for pre-operative evaluation requested by Dr. Davidson Nieves. Will have hysterectomy robotic assisted laparoscopic, bilateral salpingectomy, cystoscopy, and possible exploratory laparotomy at [facility] on January 15, 2019.   Patient has been exper Rfl:      ALLERGIES: Sulfa Antibiotics  Social History: Social History    Socioeconomic History      Marital status:       Spouse name: Not on file      Number of children: Not on file      Years of education: Not on file      Highest education leve diarrhea. Regular stools. MUSCULOSKELETAL: Denies arthralgias or myalgias. NEURO: Denies any new headaches or change in headache pattern. PSYCHE: see HPI  HEMATOLOGY: denies hx anemia; denies bruising or excessive bleeding.   ENDOCRINE: denies excessive urinalysis. Urinalysis will show moderate to large blood as she is having a heavy menstrual bleed. Adult ADD. Refilled Adderall 20 mg twice daily times 3 months. Increase in refilled amitriptyline to 40 mg, four 10 mg tablets, nightly.   Patient underst

## 2019-01-10 RX ORDER — FIBER
1 TABLET ORAL DAILY
COMMUNITY

## 2019-01-14 ENCOUNTER — ANESTHESIA EVENT (OUTPATIENT)
Dept: SURGERY | Facility: HOSPITAL | Age: 49
End: 2019-01-14

## 2019-01-15 ENCOUNTER — ANESTHESIA (OUTPATIENT)
Dept: SURGERY | Facility: HOSPITAL | Age: 49
End: 2019-01-15

## 2019-01-15 ENCOUNTER — HOSPITAL ENCOUNTER (OUTPATIENT)
Facility: HOSPITAL | Age: 49
Setting detail: OBSERVATION
Discharge: HOME | End: 2019-01-16
Attending: OBSTETRICS & GYNECOLOGY | Admitting: OBSTETRICS & GYNECOLOGY
Payer: COMMERCIAL

## 2019-01-15 DIAGNOSIS — N92.0 MENORRHAGIA WITH REGULAR CYCLE: ICD-10-CM

## 2019-01-15 DIAGNOSIS — D64.9 ANEMIA, UNSPECIFIED TYPE: ICD-10-CM

## 2019-01-15 DIAGNOSIS — N93.9 ABNORMAL UTERINE BLEEDING: ICD-10-CM

## 2019-01-15 DIAGNOSIS — D25.9 UTERINE LEIOMYOMA, UNSPECIFIED LOCATION: ICD-10-CM

## 2019-01-15 LAB
ANTIBODY SCREEN: NEGATIVE
POCT LOT NUMBER: NORMAL
POCT URINE PREGNANCY: NEGATIVE
RH BLOOD TYPE: POSITIVE

## 2019-01-15 PROCEDURE — 58571 TLH W/T/O 250 G OR LESS: CPT | Performed by: OBSTETRICS & GYNECOLOGY

## 2019-01-15 PROCEDURE — 8E0W4CZ ROBOTIC ASSISTED PROCEDURE OF TRUNK REGION, PERCUTANEOUS ENDOSCOPIC APPROACH: ICD-10-PCS | Performed by: OBSTETRICS & GYNECOLOGY

## 2019-01-15 PROCEDURE — 0UT94ZZ RESECTION OF UTERUS, PERCUTANEOUS ENDOSCOPIC APPROACH: ICD-10-PCS | Performed by: OBSTETRICS & GYNECOLOGY

## 2019-01-15 PROCEDURE — 0UT74ZZ RESECTION OF BILATERAL FALLOPIAN TUBES, PERCUTANEOUS ENDOSCOPIC APPROACH: ICD-10-PCS | Performed by: OBSTETRICS & GYNECOLOGY

## 2019-01-15 RX ORDER — DAPSONE 75 MG/G
GEL TOPICAL DAILY
COMMUNITY

## 2019-01-15 RX ORDER — ONDANSETRON 4 MG/1
4 TABLET, FILM COATED ORAL EVERY 8 HOURS PRN
Status: DISCONTINUED | OUTPATIENT
Start: 2019-01-15 | End: 2019-01-16

## 2019-01-15 RX ORDER — HYDROMORPHONE HYDROCHLORIDE 1 MG/ML
INJECTION, SOLUTION INTRAMUSCULAR; INTRAVENOUS; SUBCUTANEOUS
Status: COMPLETED
Start: 2019-01-15 | End: 2019-01-15

## 2019-01-15 RX ORDER — ENOXAPARIN SODIUM 100 MG/ML
40 INJECTION SUBCUTANEOUS DAILY
Status: DISCONTINUED | OUTPATIENT
Start: 2019-01-15 | End: 2019-01-16

## 2019-01-15 RX ORDER — SODIUM CHLORIDE, SODIUM LACTATE, POTASSIUM CHLORIDE, CALCIUM CHLORIDE 600; 310; 30; 20 MG/100ML; MG/100ML; MG/100ML; MG/100ML
INJECTION, SOLUTION INTRAVENOUS CONTINUOUS
Status: DISCONTINUED | OUTPATIENT
Start: 2019-01-15 | End: 2019-01-15 | Stop reason: HOSPADM

## 2019-01-15 RX ORDER — DIPHENHYDRAMINE HYDROCHLORIDE 50 MG/ML
12.5 INJECTION INTRAMUSCULAR; INTRAVENOUS EVERY 4 HOURS PRN
Status: DISCONTINUED | OUTPATIENT
Start: 2019-01-15 | End: 2019-01-16

## 2019-01-15 RX ORDER — CEFAZOLIN SODIUM/WATER 2 G/20 ML
2 SYRINGE (ML) INTRAVENOUS ONCE
Status: COMPLETED | OUTPATIENT
Start: 2019-01-15 | End: 2019-01-15

## 2019-01-15 RX ORDER — HEPARIN SODIUM 5000 [USP'U]/ML
5000 INJECTION, SOLUTION INTRAVENOUS; SUBCUTANEOUS ONCE
Status: COMPLETED | OUTPATIENT
Start: 2019-01-15 | End: 2019-01-15

## 2019-01-15 RX ORDER — ACETAMINOPHEN 325 MG/1
650 TABLET ORAL EVERY 4 HOURS PRN
Status: DISCONTINUED | OUTPATIENT
Start: 2019-01-15 | End: 2019-01-16

## 2019-01-15 RX ORDER — ONDANSETRON 2 MG/ML
4 INJECTION INTRAMUSCULAR; INTRAVENOUS EVERY 8 HOURS PRN
Status: DISCONTINUED | OUTPATIENT
Start: 2019-01-15 | End: 2019-01-16

## 2019-01-15 RX ORDER — NALOXONE HYDROCHLORIDE 0.4 MG/ML
80 INJECTION, SOLUTION INTRAMUSCULAR; INTRAVENOUS; SUBCUTANEOUS AS NEEDED
Status: DISCONTINUED | OUTPATIENT
Start: 2019-01-15 | End: 2019-01-15 | Stop reason: HOSPADM

## 2019-01-15 RX ORDER — AMITRIPTYLINE HYDROCHLORIDE 10 MG/1
40 TABLET, FILM COATED ORAL NIGHTLY
Status: DISCONTINUED | OUTPATIENT
Start: 2019-01-15 | End: 2019-01-16

## 2019-01-15 RX ORDER — HYDROCODONE BITARTRATE AND ACETAMINOPHEN 5; 325 MG/1; MG/1
2 TABLET ORAL AS NEEDED
Status: DISCONTINUED | OUTPATIENT
Start: 2019-01-15 | End: 2019-01-15 | Stop reason: HOSPADM

## 2019-01-15 RX ORDER — METOCLOPRAMIDE HYDROCHLORIDE 5 MG/ML
10 INJECTION INTRAMUSCULAR; INTRAVENOUS AS NEEDED
Status: DISCONTINUED | OUTPATIENT
Start: 2019-01-15 | End: 2019-01-15 | Stop reason: HOSPADM

## 2019-01-15 RX ORDER — HYDROCODONE BITARTRATE AND ACETAMINOPHEN 5; 325 MG/1; MG/1
2 TABLET ORAL EVERY 4 HOURS PRN
Status: DISCONTINUED | OUTPATIENT
Start: 2019-01-15 | End: 2019-01-16

## 2019-01-15 RX ORDER — ACETAMINOPHEN 500 MG
1000 TABLET ORAL ONCE
Status: DISCONTINUED | OUTPATIENT
Start: 2019-01-15 | End: 2019-01-15 | Stop reason: HOSPADM

## 2019-01-15 RX ORDER — LABETALOL HYDROCHLORIDE 5 MG/ML
5 INJECTION, SOLUTION INTRAVENOUS EVERY 5 MIN PRN
Status: DISCONTINUED | OUTPATIENT
Start: 2019-01-15 | End: 2019-01-15 | Stop reason: HOSPADM

## 2019-01-15 RX ORDER — HYDROCODONE BITARTRATE AND ACETAMINOPHEN 5; 325 MG/1; MG/1
1 TABLET ORAL EVERY 4 HOURS PRN
Status: DISCONTINUED | OUTPATIENT
Start: 2019-01-15 | End: 2019-01-16

## 2019-01-15 RX ORDER — ZOLPIDEM TARTRATE 5 MG/1
5 TABLET ORAL NIGHTLY PRN
Status: DISCONTINUED | OUTPATIENT
Start: 2019-01-15 | End: 2019-01-16

## 2019-01-15 RX ORDER — DIPHENHYDRAMINE HYDROCHLORIDE 50 MG/ML
12.5 INJECTION INTRAMUSCULAR; INTRAVENOUS AS NEEDED
Status: DISCONTINUED | OUTPATIENT
Start: 2019-01-15 | End: 2019-01-15 | Stop reason: HOSPADM

## 2019-01-15 RX ORDER — SODIUM CHLORIDE, SODIUM LACTATE, POTASSIUM CHLORIDE, CALCIUM CHLORIDE 600; 310; 30; 20 MG/100ML; MG/100ML; MG/100ML; MG/100ML
INJECTION, SOLUTION INTRAVENOUS CONTINUOUS
Status: DISCONTINUED | OUTPATIENT
Start: 2019-01-15 | End: 2019-01-16

## 2019-01-15 RX ORDER — KETOROLAC TROMETHAMINE 30 MG/ML
30 INJECTION, SOLUTION INTRAMUSCULAR; INTRAVENOUS EVERY 6 HOURS
Status: DISCONTINUED | OUTPATIENT
Start: 2019-01-15 | End: 2019-01-16

## 2019-01-15 RX ORDER — DOCUSATE SODIUM 100 MG/1
100 CAPSULE, LIQUID FILLED ORAL 2 TIMES DAILY
Status: DISCONTINUED | OUTPATIENT
Start: 2019-01-15 | End: 2019-01-16

## 2019-01-15 RX ORDER — ONDANSETRON 2 MG/ML
4 INJECTION INTRAMUSCULAR; INTRAVENOUS AS NEEDED
Status: DISCONTINUED | OUTPATIENT
Start: 2019-01-15 | End: 2019-01-15 | Stop reason: HOSPADM

## 2019-01-15 RX ORDER — HYDROCODONE BITARTRATE AND ACETAMINOPHEN 5; 325 MG/1; MG/1
1 TABLET ORAL AS NEEDED
Status: DISCONTINUED | OUTPATIENT
Start: 2019-01-15 | End: 2019-01-15 | Stop reason: HOSPADM

## 2019-01-15 RX ORDER — SCOLOPAMINE TRANSDERMAL SYSTEM 1 MG/1
1 PATCH, EXTENDED RELEASE TRANSDERMAL ONCE
Status: DISCONTINUED | OUTPATIENT
Start: 2019-01-15 | End: 2019-01-16

## 2019-01-15 RX ORDER — SODIUM CHLORIDE, SODIUM LACTATE, POTASSIUM CHLORIDE, CALCIUM CHLORIDE 600; 310; 30; 20 MG/100ML; MG/100ML; MG/100ML; MG/100ML
INJECTION, SOLUTION INTRAVENOUS CONTINUOUS
Status: DISCONTINUED | OUTPATIENT
Start: 2019-01-15 | End: 2019-01-15

## 2019-01-15 RX ORDER — ACETAMINOPHEN 10 MG/ML
INJECTION, SOLUTION INTRAVENOUS
Status: DISCONTINUED | OUTPATIENT
Start: 2019-01-15 | End: 2019-01-15 | Stop reason: HOSPADM

## 2019-01-15 RX ORDER — HYDROMORPHONE HYDROCHLORIDE 1 MG/ML
0.4 INJECTION, SOLUTION INTRAMUSCULAR; INTRAVENOUS; SUBCUTANEOUS EVERY 5 MIN PRN
Status: DISCONTINUED | OUTPATIENT
Start: 2019-01-15 | End: 2019-01-15 | Stop reason: HOSPADM

## 2019-01-15 RX ORDER — IBUPROFEN 200 MG
200 TABLET ORAL EVERY 6 HOURS PRN
Status: ON HOLD | COMMUNITY
End: 2019-01-16

## 2019-01-15 NOTE — ANESTHESIA POSTPROCEDURE EVALUATION
[address] [facility] Patient Status:  Surgery Admit   Age/Gender 51year old female MRN [id number]   Location [address] [facility] Attending Kyara Estrella, [address] [facility] [address] Day # 0 PCP Kenya Watson MD       Anesthesia Post-op

## 2019-01-15 NOTE — PLAN OF CARE
Patient/Family Goals    • Patient/Family Long Term Goal Progressing    • Patient/Family Short Term Goal Progressing        A&Ox4. VSS. Afebrile. 3L O2. Tolerating clears. Denies any nausea. Bowel sounds hypoactive, denies flatus. Abdomen soft.  Toradol sche

## 2019-01-15 NOTE — ANESTHESIA PREPROCEDURE EVALUATION
PRE-OP EVALUATION    Patient Name: Lester Chu    Pre-op Diagnosis: Abnormal uterine bleeding [N93.9]  Menorrhagia with regular cycle [N92.0]  Uterine leiomyoma, unspecified location [D25.9]  Anemia, unspecified type [D64.9]    Procedure(s):  Robotic as Intravenous PRN   acetaminophen (OFIRMEV) infusion   PRN       Outpatient Medications:    Acetaminophen (ACETAMINOPHEN EXTRA STRENGTH) 500 MG Oral Cap Take 1,000 mg by mouth one time.  Disp:  Rfl:    ibuprofen 200 MG Oral Tab Take 200 mg by mouth every 6 (s Date     01/08/2019    K 4.0 01/08/2019     01/08/2019    CO2 30.0 01/08/2019    BUN 16 01/08/2019    CREATSERUM 0.52 (L) 01/08/2019     (H) 01/08/2019    CA 9.4 01/08/2019            Airway      Mallampati: II  Mouth opening: >3 FB  TM

## 2019-01-15 NOTE — OPERATIVE REPORT
ROBOTIC ASSISTED LAPAROSCOPIC HYSTERECTOMY, ROBOTIC ASSISTED LAPAROSCOPIC BILATERAL SALPINGECTOMY AND CYSTOSCOPY     DATE OF PROCEDURE: 01/15/19    PRE-OP DIAGNOSIS: AUB, Menorrhagia with regular cycle, Anemia, Uterine fibroid     POST-OP DIAGNOSIS: SAME abdomen and vagina. Good hemostasis noted. All specimens collected and sent to pathology.      Procedure Details:   Risks, benefits, and alternatives of the procedure were discussed with patient including, but not limited to: risk of complications of anesth gravity. Attention was then turned to the abdomen. The patient has a history of abdominoplasty. Therefore, a 5 mm incision was made in left upper quadrant at Harrison's point. The 0 degree 5 mm laparoscopic camera was prepped and white balanced.  The lapa into position on the patient's right side. The robotic arms were adjusted and the second arm was attached to the umbilical cannula. The DaVinci XI camera was then inserted and proper placement was again confirmed. Tissue targeting performed.  The remaining released from each cannula and the console was undocked. The pneumoperitoneum was then released. Each cannula was removed without complications. Each incision site was closed with 4-0 Vicryl then 4-0 Monocryl subcutaneous, running sutures.  Marcaine local a

## 2019-01-15 NOTE — H&P
[address] [facility]  Obstetrics and Gynecology  History & Physical    [facility] Patient Status:  Surgery Admit    1970 MRN [id number]   Location [address] [place] [address] Attending Denys Mayorga 15 Day 0 PCP Narciso Pallas Cz ABDOMINOPLASTY     •      • ENLARGE BREAST      2011   • ORAL SURGERY PROCEDURE      tooth extraction   • TUBAL LIGATION       Family History:   Family History   Problem Relation Age of Onset   • Diabetes Father    • Diabetes Mother bilaterally     Labs:  Ref Range & Units 1/8/19  2:07 PM    WBC 4.0 - 13.0 x10(3) uL 9.0    RBC 3.80 - 5.10 x10(6)uL 4.01    HGB 12.0 - 16.0 g/dL 9.9 Abnormally low     HCT 34.0 - 50.0 % 33.7 Abnormally low     .0 - 450.0 10(3)uL 431.0      Ref Papo Lozano 51year old  female who presents for robotic assisted laparoscopic hysterectomy, bilateral salpingectomy, cystoscopy and possible laparotomy 2/2 AUB, menorrhagia with regular cycles, anemia and uterine fibroids    Patient Active Problem List:     Maxine

## 2019-01-16 VITALS
DIASTOLIC BLOOD PRESSURE: 62 MMHG | HEART RATE: 87 BPM | SYSTOLIC BLOOD PRESSURE: 117 MMHG | RESPIRATION RATE: 18 BRPM | TEMPERATURE: 98 F | BODY MASS INDEX: 25.69 KG/M2 | WEIGHT: 179.44 LBS | HEIGHT: 70 IN | OXYGEN SATURATION: 94 %

## 2019-01-16 LAB
BASOPHILS # BLD AUTO: 0.02 X10(3) UL (ref 0–0.1)
BASOPHILS NFR BLD AUTO: 0.3 %
EOSINOPHIL # BLD AUTO: 0.04 X10(3) UL (ref 0–0.3)
EOSINOPHIL NFR BLD AUTO: 0.7 %
ERYTHROCYTE [DISTWIDTH] IN BLOOD BY AUTOMATED COUNT: 16.8 % (ref 11.5–16)
HCT VFR BLD AUTO: 25.4 % (ref 34–50)
HGB BLD-MCNC: 7.2 G/DL (ref 12–16)
IMMATURE GRANULOCYTE COUNT: 0.02 X10(3) UL (ref 0–1)
IMMATURE GRANULOCYTE RATIO %: 0.3 %
LYMPHOCYTES # BLD AUTO: 2.06 X10(3) UL (ref 0.9–4)
LYMPHOCYTES NFR BLD AUTO: 35.8 %
MCH RBC QN AUTO: 23.6 PG (ref 27–33.2)
MCHC RBC AUTO-ENTMCNC: 28.3 G/DL (ref 31–37)
MCV RBC AUTO: 83.3 FL (ref 81–100)
MONOCYTES # BLD AUTO: 0.31 X10(3) UL (ref 0.1–1)
MONOCYTES NFR BLD AUTO: 5.4 %
NEUTROPHIL ABS PRELIM: 3.3 X10 (3) UL (ref 1.3–6.7)
NEUTROPHILS # BLD AUTO: 3.3 X10(3) UL (ref 1.3–6.7)
NEUTROPHILS NFR BLD AUTO: 57.5 %
PLATELET # BLD AUTO: 291 10(3)UL (ref 150–450)
RBC # BLD AUTO: 3.05 X10(6)UL (ref 3.8–5.1)
RED CELL DISTRIBUTION WIDTH-SD: 51.2 FL (ref 35.1–46.3)
WBC # BLD AUTO: 5.8 X10(3) UL (ref 4–13)

## 2019-01-16 RX ORDER — IBUPROFEN 600 MG/1
600 TABLET ORAL EVERY 6 HOURS PRN
Qty: 40 TABLET | Refills: 0 | Status: SHIPPED | OUTPATIENT
Start: 2019-01-16 | End: 2019-02-04

## 2019-01-16 RX ORDER — PSEUDOEPHEDRINE HCL 30 MG
100 TABLET ORAL 2 TIMES DAILY PRN
Qty: 30 CAPSULE | Refills: 0 | Status: SHIPPED | OUTPATIENT
Start: 2019-01-16 | End: 2019-03-04 | Stop reason: ALTCHOICE

## 2019-01-16 RX ORDER — IBUPROFEN 600 MG/1
600 TABLET ORAL EVERY 6 HOURS PRN
Status: DISCONTINUED | OUTPATIENT
Start: 2019-01-16 | End: 2019-01-16

## 2019-01-16 RX ORDER — HYDROCODONE BITARTRATE AND ACETAMINOPHEN 5; 325 MG/1; MG/1
1 TABLET ORAL EVERY 6 HOURS PRN
Qty: 30 TABLET | Refills: 0 | Status: SHIPPED | OUTPATIENT
Start: 2019-01-16 | End: 2019-02-04

## 2019-01-16 NOTE — PROGRESS NOTES
NURSING DISCHARGE NOTE    Discharged Home via Wheelchair. Accompanied by Spouse and Support staff  Belongings Taken by patient/family     VERBAL AND WRITTEN DISCHARGE INSTRUCTIONS GIVEN TO PATIENT . VERBALIZED UNDERSTANDING. SCRIPTS FILLED BY SUSAN FORD

## 2019-01-16 NOTE — PROGRESS NOTES
[facility]  Obstetrics and Gynecology Consultation   Progress Note    Justino Ochoa Patient Status:  Observation    1970 MRN [id number]   [facility] 3NW-A Attending Modesto Bullard MD   Hospital Day 0 PCP Danita Upton MD deficit disorder)     Menorrhagia with irregular cycle     Abnormal uterine bleeding (AUB)     Intramural leiomyoma of uterus     Abnormal uterine bleeding     Iron deficiency anemia due to chronic blood loss        Plan:     Postoperative exam   - continu

## 2019-01-16 NOTE — PLAN OF CARE
GASTROINTESTINAL - ADULT    • Minimal or absence of nausea and vomiting Progressing    • Maintains or returns to baseline bowel function Progressing        GENITOURINARY - ADULT    • Absence of urinary retention Progressing        PAIN - ADULT    • Colleen Owens

## 2019-01-16 NOTE — DISCHARGE SUMMARY
[facility]  Discharge Summary    Andrade Gonzalez Patient Status:  inpatient    1970 MRN [id number]   Location [facility]329-A Attending [facility] O[facility]YN   Hosp Day # 0 PCP Bonnie Ornelas MD     Date of Admission: 1/15/2019    Date of Discharge: 19 times daily. Dapsone (ACZONE) 7.5 % External Gel  Apply topically daily. docusate sodium 100 MG Oral Cap  Take 100 mg by mouth 2 (two) times daily as needed for constipation.              HYDROcodone-acetaminophen 5-325 MG Oral Tab

## 2019-01-18 NOTE — PROGRESS NOTES
Attempted to contact patient. Left message for patient to call back   Please inform of normal pathology results including uterus, tubes, ovaries and uterine fibroid. Please confirm patient doing well and meeting post operative expectations.

## 2019-01-22 NOTE — PROGRESS NOTES
Patient informed of results. She states she is feeling well. Denies any severe pain, fever, or s/s of infection. She is having some GI issues - feels like it might be a bug. She needed to schedule her post op appointment. Appointment scheduled.   To ca

## 2019-02-04 ENCOUNTER — OFFICE VISIT (OUTPATIENT)
Dept: OBGYN CLINIC | Facility: CLINIC | Age: 49
End: 2019-02-04
Payer: COMMERCIAL

## 2019-02-04 VITALS
DIASTOLIC BLOOD PRESSURE: 84 MMHG | HEIGHT: 70 IN | SYSTOLIC BLOOD PRESSURE: 136 MMHG | BODY MASS INDEX: 25.48 KG/M2 | WEIGHT: 178 LBS

## 2019-02-04 DIAGNOSIS — Z90.710 S/P LAPAROSCOPIC HYSTERECTOMY: Primary | ICD-10-CM

## 2019-02-04 PROBLEM — N92.1 MENORRHAGIA WITH IRREGULAR CYCLE: Status: RESOLVED | Noted: 2018-04-25 | Resolved: 2019-02-04

## 2019-02-04 PROBLEM — D25.1 INTRAMURAL LEIOMYOMA OF UTERUS: Status: RESOLVED | Noted: 2018-11-07 | Resolved: 2019-02-04

## 2019-02-04 PROBLEM — N93.9 ABNORMAL UTERINE BLEEDING (AUB): Status: RESOLVED | Noted: 2018-05-25 | Resolved: 2019-02-04

## 2019-02-04 PROBLEM — N93.9 ABNORMAL UTERINE BLEEDING: Status: RESOLVED | Noted: 2019-01-15 | Resolved: 2019-02-04

## 2019-02-04 PROCEDURE — 99024 POSTOP FOLLOW-UP VISIT: CPT | Performed by: OBSTETRICS & GYNECOLOGY

## 2019-02-04 NOTE — PROGRESS NOTES
[address] [facility]  Obstetrics and Gynecology  Follow Up Progress Note    Subjective:     Lester Chu is a 51year old  female who is s/p RALH, BS and cystoscopy 2/2 AUB, menorrhagia with regular cycles, anemia and uterine fibroids on 01/15/19. with regular cycles, anemia and uterine fibroids on 01/15/19 who presents for follow up     Patient Active Problem List:     Degeneration of lumbar or lumbosacral intervertebral disc     Other acne     Depression     ADD (attention deficit disorder)     Ir

## 2019-03-04 ENCOUNTER — OFFICE VISIT (OUTPATIENT)
Dept: OBGYN CLINIC | Facility: CLINIC | Age: 49
End: 2019-03-04
Payer: COMMERCIAL

## 2019-03-04 VITALS
WEIGHT: 177.19 LBS | HEART RATE: 101 BPM | DIASTOLIC BLOOD PRESSURE: 78 MMHG | BODY MASS INDEX: 25.37 KG/M2 | SYSTOLIC BLOOD PRESSURE: 120 MMHG | HEIGHT: 70 IN

## 2019-03-04 DIAGNOSIS — Z90.710 S/P LAPAROSCOPIC HYSTERECTOMY: Primary | ICD-10-CM

## 2019-03-04 PROCEDURE — 99024 POSTOP FOLLOW-UP VISIT: CPT | Performed by: OBSTETRICS & GYNECOLOGY

## 2019-03-04 NOTE — PROGRESS NOTES
[address] [facility]  Obstetrics and Gynecology  Follow Up Progress Note    Subjective:     Rene Barba is a 51year old  female who is s/p RALH, BS and cystoscopy 2/2 AUB, menorrhagia with regular cycles, anemia and uterine fibroids on 01/15/19. hysterectomy        Plan:     Post operative exam   - doing well post operatively   - physical exam within normal limits   - may return to normal activity for exercise but recommend to abstain for intercourse for 2 more weeks   - reviewed and d/w patient p

## 2019-03-18 NOTE — PROGRESS NOTES
[address] [facility]  Obstetrics and Gynecology  Follow Up Progress Note    Subjective:     Carly Rodriguez is a 51year old  female who is s/p RALH, BS and cystoscopy 2/2 AUB, menorrhagia with regular cycles, anemia and uterine fibroids on 01/15/19. therapy on area of granulation tissue   - restrict intercourse for 1 week, advise nothing in vagina if spotting noted   - may resume other normal activities     All of the findings and plan were discussed with the patient.   She notes understanding and agre

## 2019-03-19 ENCOUNTER — OFFICE VISIT (OUTPATIENT)
Dept: OBGYN CLINIC | Facility: CLINIC | Age: 49
End: 2019-03-19
Payer: COMMERCIAL

## 2019-03-19 VITALS
WEIGHT: 175.81 LBS | BODY MASS INDEX: 25.17 KG/M2 | DIASTOLIC BLOOD PRESSURE: 82 MMHG | SYSTOLIC BLOOD PRESSURE: 130 MMHG | HEIGHT: 70 IN

## 2019-03-19 DIAGNOSIS — Z90.710 S/P LAPAROSCOPIC HYSTERECTOMY: Primary | ICD-10-CM

## 2019-03-19 PROCEDURE — 99024 POSTOP FOLLOW-UP VISIT: CPT | Performed by: OBSTETRICS & GYNECOLOGY

## 2019-04-04 ENCOUNTER — TELEPHONE (OUTPATIENT)
Dept: OBGYN CLINIC | Facility: CLINIC | Age: 49
End: 2019-04-04

## 2019-04-29 ENCOUNTER — PATIENT MESSAGE (OUTPATIENT)
Dept: FAMILY MEDICINE CLINIC | Facility: CLINIC | Age: 49
End: 2019-04-29

## 2019-04-29 RX ORDER — DEXTROAMPHETAMINE SACCHARATE, AMPHETAMINE ASPARTATE, DEXTROAMPHETAMINE SULFATE AND AMPHETAMINE SULFATE 5; 5; 5; 5 MG/1; MG/1; MG/1; MG/1
20 TABLET ORAL 2 TIMES DAILY
Qty: 60 TABLET | Refills: 0 | Status: SHIPPED | OUTPATIENT
Start: 2019-05-29 | End: 2019-06-28

## 2019-04-29 RX ORDER — DEXTROAMPHETAMINE SACCHARATE, AMPHETAMINE ASPARTATE, DEXTROAMPHETAMINE SULFATE AND AMPHETAMINE SULFATE 5; 5; 5; 5 MG/1; MG/1; MG/1; MG/1
20 TABLET ORAL 2 TIMES DAILY
Qty: 60 TABLET | Refills: 0 | Status: SHIPPED | OUTPATIENT
Start: 2019-06-28 | End: 2019-07-22 | Stop reason: DRUGHIGH

## 2019-04-29 RX ORDER — DEXTROAMPHETAMINE SACCHARATE, AMPHETAMINE ASPARTATE, DEXTROAMPHETAMINE SULFATE AND AMPHETAMINE SULFATE 5; 5; 5; 5 MG/1; MG/1; MG/1; MG/1
20 TABLET ORAL 2 TIMES DAILY
Qty: 60 TABLET | Refills: 0 | Status: SHIPPED | OUTPATIENT
Start: 2019-04-29 | End: 2019-05-29

## 2019-04-29 NOTE — TELEPHONE ENCOUNTER
From: Sisi Hawley  To: Randi Skiff, MD  Sent: 4/29/2019 7:54 AM CDT  Subject: Prescription Question    Good morning Dr. Broderick Cast,  I am due to refill my adderall script on May 2nd, I believe.  Could I stop by the office to  my next three months

## 2019-04-29 NOTE — TELEPHONE ENCOUNTER
Dr. Sanjay Hunter,  See message below.   Last refill 2/4/19  Last med check visit  11/6/18    Medication pended

## 2019-05-14 ENCOUNTER — PATIENT MESSAGE (OUTPATIENT)
Dept: FAMILY MEDICINE CLINIC | Facility: CLINIC | Age: 49
End: 2019-05-14

## 2019-05-14 NOTE — TELEPHONE ENCOUNTER
From: Marzena Mittal  To: Mary Mora MD  Sent: 5/14/2019 3:07 PM CDT  Subject: Non-Urgent [facility] Staff Dr. Angelo Alonzo been having issues with my left shoulder for quite some time. Could you recommend a surgeon in the area for me?   Thank

## 2019-06-01 ENCOUNTER — WALK IN (OUTPATIENT)
Dept: URGENT CARE | Age: 49
End: 2019-06-01

## 2019-06-01 VITALS
DIASTOLIC BLOOD PRESSURE: 80 MMHG | OXYGEN SATURATION: 98 % | SYSTOLIC BLOOD PRESSURE: 120 MMHG | TEMPERATURE: 99.1 F | HEIGHT: 70 IN | WEIGHT: 165 LBS | RESPIRATION RATE: 16 BRPM | HEART RATE: 113 BPM | BODY MASS INDEX: 23.62 KG/M2

## 2019-06-01 DIAGNOSIS — R30.0 DYSURIA: Primary | ICD-10-CM

## 2019-06-01 LAB
APPEARANCE, POC: ABNORMAL
BILIRUBIN, POC: NEGATIVE
COLOR, POC: ABNORMAL
GLUCOSE UR-MCNC: NEGATIVE MG/DL
KETONES, POC: NEGATIVE
NITRITE, POC: NEGATIVE
OCCULT BLOOD, POC: ABNORMAL
PH UR: 6.5 [PH] (ref 5–7)
PROT UR-MCNC: NEGATIVE G/DL
SP GR UR: 1 (ref 1–1.03)
UROBILINOGEN UR-MCNC: 0.2 MG/DL (ref 0–1)
WBC (LEUKOCYTE) ESTERASE, POC: ABNORMAL

## 2019-06-01 PROCEDURE — 99214 OFFICE O/P EST MOD 30 MIN: CPT | Performed by: NURSE PRACTITIONER

## 2019-06-01 PROCEDURE — 81002 URINALYSIS NONAUTO W/O SCOPE: CPT | Performed by: NURSE PRACTITIONER

## 2019-06-01 RX ORDER — FIBER
1 TABLET ORAL
COMMUNITY

## 2019-06-01 RX ORDER — AMITRIPTYLINE HYDROCHLORIDE 10 MG/1
40 TABLET, FILM COATED ORAL
COMMUNITY
Start: 2019-01-08 | End: 2021-09-17 | Stop reason: ALTCHOICE

## 2019-06-01 RX ORDER — DEXTROAMPHETAMINE SACCHARATE, AMPHETAMINE ASPARTATE, DEXTROAMPHETAMINE SULFATE AND AMPHETAMINE SULFATE 5; 5; 5; 5 MG/1; MG/1; MG/1; MG/1
20 TABLET ORAL
COMMUNITY
Start: 2019-05-29 | End: 2019-07-28

## 2019-06-01 RX ORDER — CIPROFLOXACIN 500 MG/1
500 TABLET, FILM COATED ORAL 2 TIMES DAILY
Qty: 10 TABLET | Refills: 0 | Status: SHIPPED | OUTPATIENT
Start: 2019-06-01 | End: 2019-06-06

## 2019-06-01 RX ORDER — PHENAZOPYRIDINE HYDROCHLORIDE 100 MG/1
100 TABLET, FILM COATED ORAL 3 TIMES DAILY PRN
Qty: 10 TABLET | Refills: 0 | Status: SHIPPED | OUTPATIENT
Start: 2019-06-01 | End: 2021-09-17 | Stop reason: ALTCHOICE

## 2019-06-01 RX ORDER — DAPSONE 75 MG/G
GEL TOPICAL
COMMUNITY
End: 2021-09-17 | Stop reason: ALTCHOICE

## 2019-06-01 ASSESSMENT — ENCOUNTER SYMPTOMS
WHEEZING: 0
NAUSEA: 0
VOMITING: 0
EYE ITCHING: 0
EYE PAIN: 0
SORE THROAT: 0
COUGH: 0
CHILLS: 0
SHORTNESS OF BREATH: 0
FEVER: 1
ABDOMINAL PAIN: 0
SINUS PRESSURE: 0
EYE DISCHARGE: 0
SINUS PAIN: 0
EYE REDNESS: 0

## 2019-07-10 DIAGNOSIS — F34.1 DYSTHYMIA: ICD-10-CM

## 2019-07-10 RX ORDER — AMITRIPTYLINE HYDROCHLORIDE 10 MG/1
TABLET, FILM COATED ORAL
Qty: 360 TABLET | Refills: 0 | Status: SHIPPED | OUTPATIENT
Start: 2019-07-10 | End: 2019-10-03

## 2019-07-22 ENCOUNTER — APPOINTMENT (OUTPATIENT)
Dept: LAB | Age: 49
End: 2019-07-22
Attending: FAMILY MEDICINE
Payer: COMMERCIAL

## 2019-07-22 DIAGNOSIS — L65.9 ALOPECIA: ICD-10-CM

## 2019-07-22 LAB
T4 FREE SERPL-MCNC: 0.9 NG/DL (ref 0.8–1.7)
TSI SER-ACNC: 1.05 MIU/ML (ref 0.36–3.74)

## 2019-07-22 PROCEDURE — 82607 VITAMIN B-12: CPT | Performed by: FAMILY MEDICINE

## 2019-07-22 PROCEDURE — 84443 ASSAY THYROID STIM HORMONE: CPT

## 2019-07-22 PROCEDURE — 84439 ASSAY OF FREE THYROXINE: CPT

## 2019-07-22 PROCEDURE — 85025 COMPLETE CBC W/AUTO DIFF WBC: CPT | Performed by: FAMILY MEDICINE

## 2019-07-22 PROCEDURE — 36415 COLL VENOUS BLD VENIPUNCTURE: CPT

## 2019-07-22 NOTE — PROGRESS NOTES
Here in follow-up for adult ADD. Medicine seemingly working a little less well recently. She feels like she is \"spinning her wheels \". This is what she felt like when she first developed symptoms.   Denies heart palpitations, insomnia, appetite has bee ALLERGIES:   Sulfa Antibiotics  FAMILY HISTORY  Family History   Problem Relation Age of Onset   • Diabetes Father    • Diabetes Mother    • ADHD Son    • ADHD Sister    • ADHD Brother    • ADHD Other        PHYSICAL EXAM:  /74   Pulse 104   Resp

## 2019-07-29 ENCOUNTER — TELEPHONE (OUTPATIENT)
Dept: FAMILY MEDICINE CLINIC | Facility: CLINIC | Age: 49
End: 2019-07-29

## 2019-07-29 NOTE — TELEPHONE ENCOUNTER
Prior Auth received from [facility] for D-Amphetamine salt combo 30mg. pa form placed a triage bin for fup.

## 2019-10-03 DIAGNOSIS — F34.1 DYSTHYMIA: ICD-10-CM

## 2019-10-03 RX ORDER — AMITRIPTYLINE HYDROCHLORIDE 10 MG/1
TABLET, FILM COATED ORAL
Qty: 360 TABLET | Refills: 0 | Status: SHIPPED | OUTPATIENT
Start: 2019-10-03 | End: 2020-01-04

## 2019-10-24 ENCOUNTER — PATIENT MESSAGE (OUTPATIENT)
Dept: FAMILY MEDICINE CLINIC | Facility: CLINIC | Age: 49
End: 2019-10-24

## 2019-10-24 RX ORDER — DEXTROAMPHETAMINE SACCHARATE, AMPHETAMINE ASPARTATE, DEXTROAMPHETAMINE SULFATE AND AMPHETAMINE SULFATE 7.5; 7.5; 7.5; 7.5 MG/1; MG/1; MG/1; MG/1
30 TABLET ORAL DAILY
Qty: 30 TABLET | Refills: 0 | Status: SHIPPED | OUTPATIENT
Start: 2019-10-24 | End: 2019-10-28

## 2019-10-24 RX ORDER — DEXTROAMPHETAMINE SACCHARATE, AMPHETAMINE ASPARTATE, DEXTROAMPHETAMINE SULFATE AND AMPHETAMINE SULFATE 7.5; 7.5; 7.5; 7.5 MG/1; MG/1; MG/1; MG/1
30 TABLET ORAL DAILY
Qty: 30 TABLET | Refills: 0 | Status: SHIPPED | OUTPATIENT
Start: 2019-12-25 | End: 2019-10-28

## 2019-10-24 RX ORDER — DEXTROAMPHETAMINE SACCHARATE, AMPHETAMINE ASPARTATE, DEXTROAMPHETAMINE SULFATE AND AMPHETAMINE SULFATE 7.5; 7.5; 7.5; 7.5 MG/1; MG/1; MG/1; MG/1
30 TABLET ORAL DAILY
Qty: 30 TABLET | Refills: 0 | Status: SHIPPED | OUTPATIENT
Start: 2019-11-24 | End: 2019-10-28

## 2019-10-24 NOTE — TELEPHONE ENCOUNTER
Please see below, Epic is not letting me toan it for you to sign electronically, you may have to do it  (They are pended)

## 2019-10-24 NOTE — TELEPHONE ENCOUNTER
From: Dawna Kim  To: Ton Scott MD  Sent: 10/24/2019 10:05 AM CDT  Subject: Prescription Question    Good morning,  I’m due to refill my adderal prescription today. Could I come by and  another three months scripts?  I know that we normally

## 2019-10-27 ENCOUNTER — PATIENT MESSAGE (OUTPATIENT)
Dept: FAMILY MEDICINE CLINIC | Facility: CLINIC | Age: 49
End: 2019-10-27

## 2019-10-28 DIAGNOSIS — F98.8 ATTENTION DEFICIT DISORDER (ADD) WITHOUT HYPERACTIVITY: ICD-10-CM

## 2019-10-28 RX ORDER — DEXTROAMPHETAMINE SACCHARATE, AMPHETAMINE ASPARTATE, DEXTROAMPHETAMINE SULFATE AND AMPHETAMINE SULFATE 7.5; 7.5; 7.5; 7.5 MG/1; MG/1; MG/1; MG/1
30 TABLET ORAL 2 TIMES DAILY
Qty: 60 TABLET | Refills: 0 | Status: SHIPPED | OUTPATIENT
Start: 2019-10-28 | End: 2020-01-21

## 2019-10-28 NOTE — TELEPHONE ENCOUNTER
Patient is currently out of Adderall. Needs a new script sent over with the proper instructions. States she takes 30mg twice a day.   Script sent was 30mg once a day.     She is also requesting a 90 day supply.     Please advise as she has bee

## 2019-10-28 NOTE — TELEPHONE ENCOUNTER
Patient is currently out of Adderall. Needs a new script sent over with the proper instructions. States she takes 30mg twice a day. Script sent was 30mg once a day. She is also requesting a 90 day supply.     Please advise as she has been out a couple

## 2019-10-28 NOTE — TELEPHONE ENCOUNTER
From: Eliza Cabrales  To: Regan Kearney MD  Sent: 10/27/2019 2:27 PM CDT  Subject: Prescription Question    Good afternoon,  I went to  my prescription today that I requested a few days ago.  Unfortunately the scripts were send as 1/30 mg a day of

## 2019-11-20 ENCOUNTER — PATIENT MESSAGE (OUTPATIENT)
Dept: FAMILY MEDICINE CLINIC | Facility: CLINIC | Age: 49
End: 2019-11-20

## 2019-11-21 NOTE — TELEPHONE ENCOUNTER
From: Naz Gay  To: Graeme Boxer, MD  Sent: 11/20/2019 1:45 PM CST  Subject: Prescription Question    Good afternoon,  My next refill for adderall is due on November 26th.  October’s script was sent electronically, normally I would pick three months

## 2020-01-04 DIAGNOSIS — F34.1 DYSTHYMIA: ICD-10-CM

## 2020-01-04 RX ORDER — AMITRIPTYLINE HYDROCHLORIDE 10 MG/1
TABLET, FILM COATED ORAL
Qty: 360 TABLET | Refills: 0 | Status: SHIPPED | OUTPATIENT
Start: 2020-01-04 | End: 2020-03-30

## 2020-01-21 DIAGNOSIS — F98.8 ATTENTION DEFICIT DISORDER (ADD) WITHOUT HYPERACTIVITY: ICD-10-CM

## 2020-01-21 RX ORDER — DEXTROAMPHETAMINE SACCHARATE, AMPHETAMINE ASPARTATE, DEXTROAMPHETAMINE SULFATE AND AMPHETAMINE SULFATE 7.5; 7.5; 7.5; 7.5 MG/1; MG/1; MG/1; MG/1
30 TABLET ORAL 2 TIMES DAILY
Qty: 60 TABLET | Refills: 0 | Status: SHIPPED | OUTPATIENT
Start: 2020-01-21 | End: 2020-01-27

## 2020-01-21 NOTE — TELEPHONE ENCOUNTER
Last ov 7/22/19  Last refill 10/28/19      Patient is due for a f/u on ADD medication.  Please call  To schedule one

## 2020-01-25 ENCOUNTER — PATIENT MESSAGE (OUTPATIENT)
Dept: FAMILY MEDICINE CLINIC | Facility: CLINIC | Age: 50
End: 2020-01-25

## 2020-01-25 DIAGNOSIS — F98.8 ATTENTION DEFICIT DISORDER (ADD) WITHOUT HYPERACTIVITY: ICD-10-CM

## 2020-01-27 RX ORDER — DEXTROAMPHETAMINE SACCHARATE, AMPHETAMINE ASPARTATE, DEXTROAMPHETAMINE SULFATE AND AMPHETAMINE SULFATE 7.5; 7.5; 7.5; 7.5 MG/1; MG/1; MG/1; MG/1
30 TABLET ORAL 2 TIMES DAILY
Qty: 60 TABLET | Refills: 0 | Status: SHIPPED | OUTPATIENT
Start: 2020-01-27 | End: 2020-05-20

## 2020-01-27 NOTE — TELEPHONE ENCOUNTER
From: Rica Queen  To: Jaja Bashir MD  Sent: 1/25/2020 1:15 PM CST  Subject: Prescription Question    Good afternoon, my latest prescription for adderall that was electronically sent to be filled today, states fill after 58 days?  So the pharmacist i

## 2020-01-27 NOTE — TELEPHONE ENCOUNTER
Dr. Rocco Figueroa,  See below. Only was refill was given. Patient due for appt. This month    1 refill pended. I will let patient know she is due for office visit to get 3 refills.

## 2020-02-25 PROBLEM — I10 BENIGN HYPERTENSION: Status: ACTIVE | Noted: 2020-02-25

## 2020-02-25 NOTE — PROGRESS NOTES
Following up for adult ADD. Medicine has been working quite well. Denies heart palpitations, insomnia, or poor appetite. Her blood pressures been running a bit high at the chiropractor office lately.   Her  is been acting strange and she feels t 30 MG Oral Tab Take 1 tablet (30 mg total) by mouth 2 (two) times daily. 60 tablet 0   • AMITRIPTYLINE HCL 10 MG Oral Tab TAKE 4 TABLETS(40 MG) BY MOUTH EVERY NIGHT 360 tablet 0   • Dapsone (ACZONE) 7.5 % External Gel Apply topically daily.      • Multiple

## 2020-03-30 DIAGNOSIS — F34.1 DYSTHYMIA: ICD-10-CM

## 2020-03-30 RX ORDER — AMITRIPTYLINE HYDROCHLORIDE 10 MG/1
TABLET, FILM COATED ORAL
Qty: 360 TABLET | Refills: 1 | Status: SHIPPED | OUTPATIENT
Start: 2020-03-30 | End: 2020-08-18

## 2020-05-20 ENCOUNTER — PATIENT MESSAGE (OUTPATIENT)
Dept: FAMILY MEDICINE CLINIC | Facility: CLINIC | Age: 50
End: 2020-05-20

## 2020-05-20 RX ORDER — DEXTROAMPHETAMINE SACCHARATE, AMPHETAMINE ASPARTATE, DEXTROAMPHETAMINE SULFATE AND AMPHETAMINE SULFATE 7.5; 7.5; 7.5; 7.5 MG/1; MG/1; MG/1; MG/1
30 TABLET ORAL 2 TIMES DAILY
Qty: 60 TABLET | Refills: 0 | Status: SHIPPED | OUTPATIENT
Start: 2020-05-20 | End: 2020-06-22

## 2020-05-20 NOTE — TELEPHONE ENCOUNTER
Last Office Visit: 2-25-20 with Gregory Cloud for medication follow up   Last Rx Filled: 4-27-20 60 tabs with no refills   Last Labs: 7-22-19 cbc/vitamin B12/tsh/t4  Future Appointment: none    Per protocol to provider

## 2020-05-20 NOTE — TELEPHONE ENCOUNTER
From: Dawna Kim  To: Ton Scott MD  Sent: 5/20/2020 12:15 PM CDT  Subject: Prescription Question    The adderall script sent to [facility] is 30 mg twice per day, correct.  Also, is it for one month, or did Dr. Frederico Dandy approve three months like he

## 2020-05-22 RX ORDER — LISINOPRIL 10 MG/1
TABLET ORAL
Qty: 30 TABLET | Refills: 2 | Status: SHIPPED | OUTPATIENT
Start: 2020-05-22 | End: 2020-08-18

## 2020-06-22 ENCOUNTER — PATIENT MESSAGE (OUTPATIENT)
Dept: FAMILY MEDICINE CLINIC | Facility: CLINIC | Age: 50
End: 2020-06-22

## 2020-06-22 RX ORDER — DEXTROAMPHETAMINE SACCHARATE, AMPHETAMINE ASPARTATE, DEXTROAMPHETAMINE SULFATE AND AMPHETAMINE SULFATE 7.5; 7.5; 7.5; 7.5 MG/1; MG/1; MG/1; MG/1
30 TABLET ORAL 2 TIMES DAILY
Qty: 60 TABLET | Refills: 0 | Status: SHIPPED | OUTPATIENT
Start: 2020-06-22 | End: 2020-07-20

## 2020-06-22 NOTE — TELEPHONE ENCOUNTER
From: Chau Mcdaniels  To: Emma Draper MD  Sent: 6/22/2020 10:16 AM CDT  Subject: Prescription Question    Good morning Dr. Velazquez January,  My D-Amphetamine 30 mg/twice per day script is due for refill.  I’m not quite comfortable coming into the office for a s

## 2020-07-20 RX ORDER — DEXTROAMPHETAMINE SACCHARATE, AMPHETAMINE ASPARTATE, DEXTROAMPHETAMINE SULFATE AND AMPHETAMINE SULFATE 7.5; 7.5; 7.5; 7.5 MG/1; MG/1; MG/1; MG/1
30 TABLET ORAL 2 TIMES DAILY
Qty: 60 TABLET | Refills: 0 | Status: SHIPPED | OUTPATIENT
Start: 2020-07-20 | End: 2020-08-18

## 2020-08-18 DIAGNOSIS — F34.1 DYSTHYMIA: ICD-10-CM

## 2020-08-18 RX ORDER — DEXTROAMPHETAMINE SACCHARATE, AMPHETAMINE ASPARTATE, DEXTROAMPHETAMINE SULFATE AND AMPHETAMINE SULFATE 7.5; 7.5; 7.5; 7.5 MG/1; MG/1; MG/1; MG/1
30 TABLET ORAL 2 TIMES DAILY
Qty: 60 TABLET | Refills: 0 | Status: SHIPPED | OUTPATIENT
Start: 2020-08-18 | End: 2020-09-15

## 2020-08-18 RX ORDER — LISINOPRIL 10 MG/1
10 TABLET ORAL DAILY
Qty: 30 TABLET | Refills: 2 | Status: SHIPPED | OUTPATIENT
Start: 2020-08-18 | End: 2020-09-15

## 2020-08-18 RX ORDER — AMITRIPTYLINE HYDROCHLORIDE 10 MG/1
10 TABLET, FILM COATED ORAL NIGHTLY
Qty: 360 TABLET | Refills: 1 | Status: SHIPPED | OUTPATIENT
Start: 2020-08-18 | End: 2020-08-19

## 2020-08-18 NOTE — TELEPHONE ENCOUNTER
Last visit 02/25/2020  Last refill 07/20/2020 Adderall  Last refill 05/22/2020 Lisinopril   Last refill 03/30/2020 Amitriptyline

## 2020-08-19 ENCOUNTER — TELEPHONE (OUTPATIENT)
Dept: FAMILY MEDICINE CLINIC | Facility: CLINIC | Age: 50
End: 2020-08-19

## 2020-08-19 DIAGNOSIS — F34.1 DYSTHYMIA: ICD-10-CM

## 2020-08-19 RX ORDER — AMITRIPTYLINE HYDROCHLORIDE 10 MG/1
40 TABLET, FILM COATED ORAL NIGHTLY
Qty: 360 TABLET | Refills: 1 | COMMUNITY
Start: 2020-08-19 | End: 2020-09-15

## 2020-08-19 NOTE — TELEPHONE ENCOUNTER
It looks like it was pended to me wrong. The sig says 10 mg at night however the dispense number is 360 tablets which would be for per night for 90 days.

## 2020-08-19 NOTE — TELEPHONE ENCOUNTER
Dr. Velazquez January, please look at dose for AMITRIPTYLINE HCL 10 MG Oral Tab . Previous dose was(4 tabs) 40mg nightly, yesterday's sig says 10mg nightly.

## 2020-08-31 ENCOUNTER — PATIENT MESSAGE (OUTPATIENT)
Dept: FAMILY MEDICINE CLINIC | Facility: CLINIC | Age: 50
End: 2020-08-31

## 2020-08-31 NOTE — TELEPHONE ENCOUNTER
From: Danish Hay  To: Nichelle Saavedra MD  Sent: 8/31/2020 12:56 PM CDT  Subject: Other    My son tested positive for Covid on August 24th. I believe he will need a referral to be retested. Marcie Gottron is 22 now, no need for a pediatrician anymore.  I would li

## 2020-09-15 DIAGNOSIS — F34.1 DYSTHYMIA: ICD-10-CM

## 2020-09-15 RX ORDER — AMITRIPTYLINE HYDROCHLORIDE 10 MG/1
40 TABLET, FILM COATED ORAL NIGHTLY
Qty: 360 TABLET | Refills: 1 | Status: SHIPPED | OUTPATIENT
Start: 2020-09-15 | End: 2020-10-12

## 2020-09-15 RX ORDER — LISINOPRIL 10 MG/1
10 TABLET ORAL DAILY
Qty: 30 TABLET | Refills: 2 | Status: SHIPPED | OUTPATIENT
Start: 2020-09-15 | End: 2020-10-12

## 2020-09-15 RX ORDER — DEXTROAMPHETAMINE SACCHARATE, AMPHETAMINE ASPARTATE, DEXTROAMPHETAMINE SULFATE AND AMPHETAMINE SULFATE 7.5; 7.5; 7.5; 7.5 MG/1; MG/1; MG/1; MG/1
30 TABLET ORAL 2 TIMES DAILY
Qty: 60 TABLET | Refills: 0 | Status: SHIPPED | OUTPATIENT
Start: 2020-09-15 | End: 2020-10-12

## 2020-10-12 DIAGNOSIS — F34.1 DYSTHYMIA: ICD-10-CM

## 2020-10-13 RX ORDER — DEXTROAMPHETAMINE SACCHARATE, AMPHETAMINE ASPARTATE, DEXTROAMPHETAMINE SULFATE AND AMPHETAMINE SULFATE 7.5; 7.5; 7.5; 7.5 MG/1; MG/1; MG/1; MG/1
30 TABLET ORAL 2 TIMES DAILY
Qty: 60 TABLET | Refills: 0 | Status: SHIPPED | OUTPATIENT
Start: 2020-10-13 | End: 2020-11-10

## 2020-10-13 RX ORDER — AMITRIPTYLINE HYDROCHLORIDE 10 MG/1
40 TABLET, FILM COATED ORAL NIGHTLY
Qty: 360 TABLET | Refills: 1 | Status: SHIPPED | OUTPATIENT
Start: 2020-10-13 | End: 2020-11-10

## 2020-10-13 RX ORDER — LISINOPRIL 10 MG/1
10 TABLET ORAL DAILY
Qty: 30 TABLET | Refills: 2 | Status: SHIPPED | OUTPATIENT
Start: 2020-10-13 | End: 2020-11-10

## 2020-11-10 DIAGNOSIS — F34.1 DYSTHYMIA: ICD-10-CM

## 2020-11-10 RX ORDER — LISINOPRIL 10 MG/1
10 TABLET ORAL DAILY
Qty: 30 TABLET | Refills: 2 | Status: SHIPPED | OUTPATIENT
Start: 2020-11-10 | End: 2020-12-07

## 2020-11-10 RX ORDER — DEXTROAMPHETAMINE SACCHARATE, AMPHETAMINE ASPARTATE, DEXTROAMPHETAMINE SULFATE AND AMPHETAMINE SULFATE 7.5; 7.5; 7.5; 7.5 MG/1; MG/1; MG/1; MG/1
30 TABLET ORAL 2 TIMES DAILY
Qty: 60 TABLET | Refills: 0 | Status: SHIPPED | OUTPATIENT
Start: 2020-11-10 | End: 2020-12-07

## 2020-11-10 RX ORDER — AMITRIPTYLINE HYDROCHLORIDE 10 MG/1
40 TABLET, FILM COATED ORAL NIGHTLY
Qty: 360 TABLET | Refills: 1 | Status: SHIPPED | OUTPATIENT
Start: 2020-11-10 | End: 2020-12-07

## 2020-12-07 DIAGNOSIS — F34.1 DYSTHYMIA: ICD-10-CM

## 2020-12-07 RX ORDER — AMITRIPTYLINE HYDROCHLORIDE 10 MG/1
40 TABLET, FILM COATED ORAL NIGHTLY
Qty: 360 TABLET | Refills: 1 | Status: SHIPPED | OUTPATIENT
Start: 2020-12-07 | End: 2021-01-04

## 2020-12-07 RX ORDER — LISINOPRIL 10 MG/1
10 TABLET ORAL DAILY
Qty: 30 TABLET | Refills: 2 | Status: SHIPPED | OUTPATIENT
Start: 2020-12-07 | End: 2021-01-04

## 2020-12-07 RX ORDER — DEXTROAMPHETAMINE SACCHARATE, AMPHETAMINE ASPARTATE, DEXTROAMPHETAMINE SULFATE AND AMPHETAMINE SULFATE 7.5; 7.5; 7.5; 7.5 MG/1; MG/1; MG/1; MG/1
30 TABLET ORAL 2 TIMES DAILY
Qty: 60 TABLET | Refills: 0 | Status: SHIPPED | OUTPATIENT
Start: 2020-12-07 | End: 2021-01-04

## 2021-01-04 DIAGNOSIS — F34.1 DYSTHYMIA: ICD-10-CM

## 2021-01-04 RX ORDER — DEXTROAMPHETAMINE SACCHARATE, AMPHETAMINE ASPARTATE, DEXTROAMPHETAMINE SULFATE AND AMPHETAMINE SULFATE 7.5; 7.5; 7.5; 7.5 MG/1; MG/1; MG/1; MG/1
30 TABLET ORAL 2 TIMES DAILY
Qty: 60 TABLET | Refills: 0 | Status: SHIPPED | OUTPATIENT
Start: 2021-01-04 | End: 2021-02-03

## 2021-01-04 RX ORDER — AMITRIPTYLINE HYDROCHLORIDE 10 MG/1
40 TABLET, FILM COATED ORAL NIGHTLY
Qty: 360 TABLET | Refills: 1 | Status: SHIPPED | OUTPATIENT
Start: 2021-01-04 | End: 2021-02-03

## 2021-01-04 RX ORDER — LISINOPRIL 10 MG/1
10 TABLET ORAL DAILY
Qty: 30 TABLET | Refills: 2 | Status: SHIPPED | OUTPATIENT
Start: 2021-01-04 | End: 2021-02-03

## 2021-02-03 DIAGNOSIS — F34.1 DYSTHYMIA: ICD-10-CM

## 2021-02-04 RX ORDER — AMITRIPTYLINE HYDROCHLORIDE 10 MG/1
40 TABLET, FILM COATED ORAL NIGHTLY
Qty: 360 TABLET | Refills: 1 | Status: SHIPPED | OUTPATIENT
Start: 2021-02-04 | End: 2021-03-11

## 2021-02-04 RX ORDER — DEXTROAMPHETAMINE SACCHARATE, AMPHETAMINE ASPARTATE, DEXTROAMPHETAMINE SULFATE AND AMPHETAMINE SULFATE 7.5; 7.5; 7.5; 7.5 MG/1; MG/1; MG/1; MG/1
30 TABLET ORAL 2 TIMES DAILY
Qty: 60 TABLET | Refills: 0 | Status: SHIPPED | OUTPATIENT
Start: 2021-02-04 | End: 2021-03-06

## 2021-02-04 RX ORDER — LISINOPRIL 10 MG/1
10 TABLET ORAL DAILY
Qty: 30 TABLET | Refills: 2 | Status: SHIPPED | OUTPATIENT
Start: 2021-02-04 | End: 2021-04-11

## 2021-02-04 NOTE — TELEPHONE ENCOUNTER
Rx Request  Amitriptyline HCl 10 MG Oral Tab   Disp:    360                R: 1  lisinopril 10 MG Oral Tab   Disp:    30                R: 2  amphetamine-dextroamphetamine (ADDERALL) 30 MG Oral Tab   Disp:    60                R: 0      Last Refilled: 01/0

## 2021-03-08 ENCOUNTER — PATIENT MESSAGE (OUTPATIENT)
Dept: FAMILY MEDICINE CLINIC | Facility: CLINIC | Age: 51
End: 2021-03-08

## 2021-03-08 NOTE — TELEPHONE ENCOUNTER
From: Dawna Kim  To: Ton Scott MD  Sent: 3/8/2021 8:50 AM CST  Subject: Prescription Question    Good morning   I’m trying to request a refill for D-amphetamine 30mg twice daily, but it’s no longer listed with my other medications.

## 2021-03-11 ENCOUNTER — TELEMEDICINE (OUTPATIENT)
Dept: FAMILY MEDICINE CLINIC | Facility: CLINIC | Age: 51
End: 2021-03-11
Payer: COMMERCIAL

## 2021-03-11 DIAGNOSIS — F34.1 DYSTHYMIA: ICD-10-CM

## 2021-03-11 DIAGNOSIS — F98.8 ATTENTION DEFICIT DISORDER (ADD) WITHOUT HYPERACTIVITY: ICD-10-CM

## 2021-03-11 DIAGNOSIS — I10 BENIGN HYPERTENSION: Primary | ICD-10-CM

## 2021-03-11 PROCEDURE — 99213 OFFICE O/P EST LOW 20 MIN: CPT | Performed by: FAMILY MEDICINE

## 2021-03-11 RX ORDER — AMITRIPTYLINE HYDROCHLORIDE 50 MG/1
50 TABLET, FILM COATED ORAL NIGHTLY
Qty: 90 TABLET | Refills: 3 | Status: SHIPPED | OUTPATIENT
Start: 2021-03-11 | End: 2021-04-11

## 2021-03-11 RX ORDER — DEXTROAMPHETAMINE SACCHARATE, AMPHETAMINE ASPARTATE, DEXTROAMPHETAMINE SULFATE AND AMPHETAMINE SULFATE 7.5; 7.5; 7.5; 7.5 MG/1; MG/1; MG/1; MG/1
30 TABLET ORAL 2 TIMES DAILY
Qty: 60 TABLET | Refills: 0 | Status: SHIPPED | OUTPATIENT
Start: 2021-05-12 | End: 2021-06-10

## 2021-03-11 RX ORDER — DEXTROAMPHETAMINE SACCHARATE, AMPHETAMINE ASPARTATE, DEXTROAMPHETAMINE SULFATE AND AMPHETAMINE SULFATE 7.5; 7.5; 7.5; 7.5 MG/1; MG/1; MG/1; MG/1
30 TABLET ORAL 2 TIMES DAILY
Qty: 60 TABLET | Refills: 0 | Status: SHIPPED | OUTPATIENT
Start: 2021-03-11 | End: 2021-04-10

## 2021-03-11 RX ORDER — DEXTROAMPHETAMINE SACCHARATE, AMPHETAMINE ASPARTATE, DEXTROAMPHETAMINE SULFATE AND AMPHETAMINE SULFATE 7.5; 7.5; 7.5; 7.5 MG/1; MG/1; MG/1; MG/1
30 TABLET ORAL 2 TIMES DAILY
Qty: 60 TABLET | Refills: 0 | Status: SHIPPED | OUTPATIENT
Start: 2021-04-11 | End: 2021-05-11

## 2021-03-11 NOTE — PROGRESS NOTES
Raghu video [address] [facility] verbally consents to a Virtual/Telephone Check-In visit on 03/11/21. Patient understands and accepts financial responsibility for any deductible, co-insurance and/or co-pays associated with this service.     Dura amphetamine-dextroamphetamine (ADDERALL) 30 MG Oral Tab Take 1 tablet (30 mg total) by mouth 2 (two) times daily.  60 tablet 0   • [START ON 5/12/2021] amphetamine-dextroamphetamine (ADDERALL) 30 MG Oral Tab Take 1 tablet (30 mg total) by mouth 2 (two) time with the patient in face to face discussion and examination, and the time documenting the visit. It may also include time ordering tests or reviewing test results completed on the same day.           Please note that the following visit was completed using

## 2021-04-11 DIAGNOSIS — F98.8 ATTENTION DEFICIT DISORDER (ADD) WITHOUT HYPERACTIVITY: ICD-10-CM

## 2021-04-11 DIAGNOSIS — F34.1 DYSTHYMIA: ICD-10-CM

## 2021-04-11 RX ORDER — DEXTROAMPHETAMINE SACCHARATE, AMPHETAMINE ASPARTATE, DEXTROAMPHETAMINE SULFATE AND AMPHETAMINE SULFATE 7.5; 7.5; 7.5; 7.5 MG/1; MG/1; MG/1; MG/1
30 TABLET ORAL 2 TIMES DAILY
Qty: 60 TABLET | Refills: 0 | Status: CANCELLED | OUTPATIENT
Start: 2021-04-11 | End: 2021-05-11

## 2021-04-12 RX ORDER — AMITRIPTYLINE HYDROCHLORIDE 50 MG/1
50 TABLET, FILM COATED ORAL NIGHTLY
Qty: 90 TABLET | Refills: 3 | Status: SHIPPED | OUTPATIENT
Start: 2021-04-12 | End: 2021-05-10

## 2021-04-12 RX ORDER — LISINOPRIL 10 MG/1
10 TABLET ORAL DAILY
Qty: 30 TABLET | Refills: 2 | Status: SHIPPED | OUTPATIENT
Start: 2021-04-12 | End: 2021-05-10

## 2021-05-10 DIAGNOSIS — F98.8 ATTENTION DEFICIT DISORDER (ADD) WITHOUT HYPERACTIVITY: ICD-10-CM

## 2021-05-10 DIAGNOSIS — F34.1 DYSTHYMIA: ICD-10-CM

## 2021-05-10 RX ORDER — AMITRIPTYLINE HYDROCHLORIDE 50 MG/1
50 TABLET, FILM COATED ORAL NIGHTLY
Qty: 90 TABLET | Refills: 3 | Status: SHIPPED | OUTPATIENT
Start: 2021-05-10 | End: 2021-06-10

## 2021-05-10 RX ORDER — DEXTROAMPHETAMINE SACCHARATE, AMPHETAMINE ASPARTATE, DEXTROAMPHETAMINE SULFATE AND AMPHETAMINE SULFATE 7.5; 7.5; 7.5; 7.5 MG/1; MG/1; MG/1; MG/1
30 TABLET ORAL 2 TIMES DAILY
Qty: 60 TABLET | Refills: 0 | Status: CANCELLED | OUTPATIENT
Start: 2021-05-10 | End: 2021-06-09

## 2021-05-10 RX ORDER — LISINOPRIL 10 MG/1
10 TABLET ORAL DAILY
Qty: 30 TABLET | Refills: 2 | Status: SHIPPED | OUTPATIENT
Start: 2021-05-10 | End: 2021-06-10

## 2021-06-09 DIAGNOSIS — F98.8 ATTENTION DEFICIT DISORDER (ADD) WITHOUT HYPERACTIVITY: ICD-10-CM

## 2021-06-09 DIAGNOSIS — F34.1 DYSTHYMIA: ICD-10-CM

## 2021-06-09 RX ORDER — DEXTROAMPHETAMINE SACCHARATE, AMPHETAMINE ASPARTATE, DEXTROAMPHETAMINE SULFATE AND AMPHETAMINE SULFATE 7.5; 7.5; 7.5; 7.5 MG/1; MG/1; MG/1; MG/1
30 TABLET ORAL 2 TIMES DAILY
Qty: 60 TABLET | Refills: 0 | OUTPATIENT
Start: 2021-06-09 | End: 2021-07-09

## 2021-06-09 RX ORDER — AMITRIPTYLINE HYDROCHLORIDE 50 MG/1
50 TABLET, FILM COATED ORAL NIGHTLY
Qty: 90 TABLET | Refills: 3 | OUTPATIENT
Start: 2021-06-09

## 2021-06-09 RX ORDER — LISINOPRIL 10 MG/1
10 TABLET ORAL DAILY
Qty: 30 TABLET | Refills: 2 | OUTPATIENT
Start: 2021-06-09

## 2021-06-10 ENCOUNTER — PATIENT MESSAGE (OUTPATIENT)
Dept: FAMILY MEDICINE CLINIC | Facility: CLINIC | Age: 51
End: 2021-06-10

## 2021-06-10 DIAGNOSIS — F34.1 DYSTHYMIA: ICD-10-CM

## 2021-06-10 DIAGNOSIS — F98.8 ATTENTION DEFICIT DISORDER (ADD) WITHOUT HYPERACTIVITY: ICD-10-CM

## 2021-06-10 RX ORDER — DEXTROAMPHETAMINE SACCHARATE, AMPHETAMINE ASPARTATE, DEXTROAMPHETAMINE SULFATE AND AMPHETAMINE SULFATE 7.5; 7.5; 7.5; 7.5 MG/1; MG/1; MG/1; MG/1
30 TABLET ORAL 2 TIMES DAILY
Qty: 60 TABLET | Refills: 0 | Status: SHIPPED | OUTPATIENT
Start: 2021-08-11 | End: 2021-09-08

## 2021-06-10 RX ORDER — DEXTROAMPHETAMINE SACCHARATE, AMPHETAMINE ASPARTATE, DEXTROAMPHETAMINE SULFATE AND AMPHETAMINE SULFATE 7.5; 7.5; 7.5; 7.5 MG/1; MG/1; MG/1; MG/1
30 TABLET ORAL 2 TIMES DAILY
Qty: 60 TABLET | Refills: 0 | Status: CANCELLED | OUTPATIENT
Start: 2021-06-10 | End: 2021-07-10

## 2021-06-10 RX ORDER — DEXTROAMPHETAMINE SACCHARATE, AMPHETAMINE ASPARTATE, DEXTROAMPHETAMINE SULFATE AND AMPHETAMINE SULFATE 7.5; 7.5; 7.5; 7.5 MG/1; MG/1; MG/1; MG/1
30 TABLET ORAL 2 TIMES DAILY
Qty: 60 TABLET | Refills: 0 | Status: SHIPPED | OUTPATIENT
Start: 2021-07-11 | End: 2021-08-10

## 2021-06-10 RX ORDER — DEXTROAMPHETAMINE SACCHARATE, AMPHETAMINE ASPARTATE, DEXTROAMPHETAMINE SULFATE AND AMPHETAMINE SULFATE 7.5; 7.5; 7.5; 7.5 MG/1; MG/1; MG/1; MG/1
30 TABLET ORAL 2 TIMES DAILY
Qty: 60 TABLET | Refills: 0 | Status: SHIPPED | OUTPATIENT
Start: 2021-06-10 | End: 2021-07-10

## 2021-06-10 NOTE — TELEPHONE ENCOUNTER
Dr. Pito Mathew,  Please see refill request for Adderall.   Last refilled - 3/11/21  Last office visit - televisit 3/11/21    medication pended

## 2021-06-10 NOTE — TELEPHONE ENCOUNTER
From: Frieda Ghotra  To: Amairani Rocha MD  Sent: 6/10/2021 3:11 PM CDT  Subject: Prescription Question    Good afternoon  I was able to fill two of my three prescriptions. Could you please send a refill request for the Adderral 30mg twice daily.  They do

## 2021-06-11 RX ORDER — AMITRIPTYLINE HYDROCHLORIDE 50 MG/1
50 TABLET, FILM COATED ORAL NIGHTLY
Qty: 90 TABLET | Refills: 3 | Status: SHIPPED | OUTPATIENT
Start: 2021-06-11 | End: 2021-10-07

## 2021-06-11 RX ORDER — LISINOPRIL 10 MG/1
10 TABLET ORAL DAILY
Qty: 30 TABLET | Refills: 2 | Status: SHIPPED | OUTPATIENT
Start: 2021-06-11 | End: 2021-10-07

## 2021-09-08 RX ORDER — DEXTROAMPHETAMINE SACCHARATE, AMPHETAMINE ASPARTATE, DEXTROAMPHETAMINE SULFATE AND AMPHETAMINE SULFATE 7.5; 7.5; 7.5; 7.5 MG/1; MG/1; MG/1; MG/1
30 TABLET ORAL 2 TIMES DAILY
Qty: 60 TABLET | Refills: 0 | Status: SHIPPED | OUTPATIENT
Start: 2021-09-08 | End: 2021-10-07

## 2021-09-17 ENCOUNTER — WALK IN (OUTPATIENT)
Dept: URGENT CARE | Age: 51
End: 2021-09-17

## 2021-09-17 VITALS
WEIGHT: 146 LBS | SYSTOLIC BLOOD PRESSURE: 130 MMHG | DIASTOLIC BLOOD PRESSURE: 88 MMHG | HEART RATE: 100 BPM | HEIGHT: 70 IN | BODY MASS INDEX: 20.9 KG/M2 | RESPIRATION RATE: 20 BRPM | TEMPERATURE: 99 F

## 2021-09-17 DIAGNOSIS — Z23 NEED FOR VACCINATION: ICD-10-CM

## 2021-09-17 DIAGNOSIS — N30.00 ACUTE CYSTITIS WITHOUT HEMATURIA: Primary | ICD-10-CM

## 2021-09-17 PROBLEM — J30.9 ALLERGIC RHINITIS: Status: ACTIVE | Noted: 2017-09-19

## 2021-09-17 PROBLEM — I10 BENIGN HYPERTENSION: Status: ACTIVE | Noted: 2020-02-25

## 2021-09-17 PROBLEM — D50.0 IRON DEFICIENCY ANEMIA DUE TO CHRONIC BLOOD LOSS: Status: ACTIVE | Noted: 2021-09-17

## 2021-09-17 PROBLEM — Z90.710 S/P LAPAROSCOPIC HYSTERECTOMY: Status: ACTIVE | Noted: 2019-02-04

## 2021-09-17 LAB
APPEARANCE, POC: CLEAR
BILIRUBIN, POC: NEGATIVE
COLOR, POC: ABNORMAL
GLUCOSE UR-MCNC: NEGATIVE MG/DL
KETONES, POC: NEGATIVE MG/DL
OCCULT BLOOD, POC: NEGATIVE
PH UR: 6.5 [PH] (ref 5–7)
PROT UR-MCNC: NEGATIVE MG/DL
SP GR UR: 1.01 (ref 1–1.03)
WBC (LEUKOCYTE) ESTERASE, POC: ABNORMAL

## 2021-09-17 PROCEDURE — 99214 OFFICE O/P EST MOD 30 MIN: CPT | Performed by: NURSE PRACTITIONER

## 2021-09-17 PROCEDURE — 87088 URINE BACTERIA CULTURE: CPT | Performed by: NURSE PRACTITIONER

## 2021-09-17 PROCEDURE — 3075F SYST BP GE 130 - 139MM HG: CPT | Performed by: NURSE PRACTITIONER

## 2021-09-17 PROCEDURE — 90471 IMMUNIZATION ADMIN: CPT | Performed by: NURSE PRACTITIONER

## 2021-09-17 PROCEDURE — 87186 SC STD MICRODIL/AGAR DIL: CPT | Performed by: NURSE PRACTITIONER

## 2021-09-17 PROCEDURE — 90686 IIV4 VACC NO PRSV 0.5 ML IM: CPT | Performed by: NURSE PRACTITIONER

## 2021-09-17 PROCEDURE — 87086 URINE CULTURE/COLONY COUNT: CPT | Performed by: NURSE PRACTITIONER

## 2021-09-17 PROCEDURE — 81002 URINALYSIS NONAUTO W/O SCOPE: CPT | Performed by: NURSE PRACTITIONER

## 2021-09-17 PROCEDURE — 3079F DIAST BP 80-89 MM HG: CPT | Performed by: NURSE PRACTITIONER

## 2021-09-17 RX ORDER — NITROFURANTOIN 25; 75 MG/1; MG/1
100 CAPSULE ORAL 2 TIMES DAILY
Qty: 10 CAPSULE | Refills: 0 | Status: SHIPPED | OUTPATIENT
Start: 2021-09-17

## 2021-09-17 RX ORDER — AMITRIPTYLINE HYDROCHLORIDE 50 MG/1
TABLET, FILM COATED ORAL
COMMUNITY
Start: 2021-09-08

## 2021-09-17 RX ORDER — LISINOPRIL 10 MG/1
TABLET ORAL
COMMUNITY
Start: 2021-09-08

## 2021-09-17 RX ORDER — DEXTROAMPHETAMINE SACCHARATE, AMPHETAMINE ASPARTATE, DEXTROAMPHETAMINE SULFATE AND AMPHETAMINE SULFATE 7.5; 7.5; 7.5; 7.5 MG/1; MG/1; MG/1; MG/1
TABLET ORAL
COMMUNITY
Start: 2021-09-08

## 2021-09-17 ASSESSMENT — ENCOUNTER SYMPTOMS
GASTROINTESTINAL NEGATIVE: 1
CONSTITUTIONAL NEGATIVE: 1

## 2021-09-19 LAB — BACTERIA UR CULT: ABNORMAL

## 2021-09-20 ENCOUNTER — TELEPHONE (OUTPATIENT)
Dept: URGENT CARE | Age: 51
End: 2021-09-20

## 2021-10-07 DIAGNOSIS — F34.1 DYSTHYMIA: ICD-10-CM

## 2021-10-07 RX ORDER — AMITRIPTYLINE HYDROCHLORIDE 50 MG/1
50 TABLET, FILM COATED ORAL NIGHTLY
Qty: 90 TABLET | Refills: 3 | Status: SHIPPED | OUTPATIENT
Start: 2021-10-07 | End: 2021-11-30

## 2021-10-07 RX ORDER — DEXTROAMPHETAMINE SACCHARATE, AMPHETAMINE ASPARTATE, DEXTROAMPHETAMINE SULFATE AND AMPHETAMINE SULFATE 7.5; 7.5; 7.5; 7.5 MG/1; MG/1; MG/1; MG/1
30 TABLET ORAL 2 TIMES DAILY
Qty: 60 TABLET | Refills: 0 | Status: SHIPPED | OUTPATIENT
Start: 2021-10-07 | End: 2021-11-08

## 2021-10-07 RX ORDER — LISINOPRIL 10 MG/1
10 TABLET ORAL DAILY
Qty: 30 TABLET | Refills: 2 | Status: SHIPPED | OUTPATIENT
Start: 2021-10-07 | End: 2021-11-30

## 2021-11-08 ENCOUNTER — PATIENT MESSAGE (OUTPATIENT)
Dept: FAMILY MEDICINE CLINIC | Facility: CLINIC | Age: 51
End: 2021-11-08

## 2021-11-08 RX ORDER — DEXTROAMPHETAMINE SACCHARATE, AMPHETAMINE ASPARTATE, DEXTROAMPHETAMINE SULFATE AND AMPHETAMINE SULFATE 7.5; 7.5; 7.5; 7.5 MG/1; MG/1; MG/1; MG/1
30 TABLET ORAL 2 TIMES DAILY
Qty: 60 TABLET | Refills: 0 | Status: SHIPPED | OUTPATIENT
Start: 2021-11-08 | End: 2021-11-30

## 2021-11-08 NOTE — TELEPHONE ENCOUNTER
From: Consuelo Shown  To: Tammie Zheng MD  Sent: 11/8/2021 12:15 PM CST  Subject: Script refill    Good morning, could you please send a script refill for my adderal prescription. [facility] doesn’t have any on file, and I need to refill.   Thanks,  Melan

## 2021-11-30 NOTE — PROGRESS NOTES
Virtual Video Check-In    Darwin Bustosbury verbally consents to a Virtual/Telephone Check-In visit on 11/30/21. Patient understands and accepts financial responsibility for any deductible, co-insurance and/or co-pays associated with this service.     Dur mouth 2 (two) times daily. 60 tablet 0   • [START ON 12/7/2021] lisinopril 10 MG Oral Tab Take 1 tablet (10 mg total) by mouth daily. 30 tablet 5   • [START ON 12/7/2021] amitriptyline 50 MG Oral Tab Take 1 tablet (50 mg total) by mouth nightly.  30 tablet discussion and examination, and the time documenting the visit. It may also include time ordering tests or reviewing test results completed on the same day.           Please note that the following visit was completed using two-way, real-time interactive a

## 2022-01-11 ENCOUNTER — PATIENT MESSAGE (OUTPATIENT)
Dept: FAMILY MEDICINE CLINIC | Facility: CLINIC | Age: 52
End: 2022-01-11

## 2022-01-12 NOTE — TELEPHONE ENCOUNTER
From: Danyelle Og  To: Leslie Crawford MD  Sent: 1/11/2022 5:56 PM CST  Subject: Blood pressure     Hello Dr. Selena Johnson wanted me to get my bp checked.  My sister Sam Terry is a nurse, and she did the honors :)  My blood pressure is 144/90

## 2022-06-03 ENCOUNTER — PATIENT MESSAGE (OUTPATIENT)
Dept: FAMILY MEDICINE CLINIC | Facility: CLINIC | Age: 52
End: 2022-06-03

## 2022-06-03 RX ORDER — DEXTROAMPHETAMINE SACCHARATE, AMPHETAMINE ASPARTATE, DEXTROAMPHETAMINE SULFATE AND AMPHETAMINE SULFATE 7.5; 7.5; 7.5; 7.5 MG/1; MG/1; MG/1; MG/1
30 TABLET ORAL DAILY
Qty: 30 TABLET | Refills: 0 | Status: SHIPPED | OUTPATIENT
Start: 2022-08-04 | End: 2022-06-03

## 2022-06-03 RX ORDER — DEXTROAMPHETAMINE SACCHARATE, AMPHETAMINE ASPARTATE, DEXTROAMPHETAMINE SULFATE AND AMPHETAMINE SULFATE 7.5; 7.5; 7.5; 7.5 MG/1; MG/1; MG/1; MG/1
30 TABLET ORAL 2 TIMES DAILY
Qty: 60 TABLET | Refills: 0 | Status: SHIPPED | OUTPATIENT
Start: 2022-07-04 | End: 2022-08-03

## 2022-06-03 RX ORDER — DEXTROAMPHETAMINE SACCHARATE, AMPHETAMINE ASPARTATE, DEXTROAMPHETAMINE SULFATE AND AMPHETAMINE SULFATE 7.5; 7.5; 7.5; 7.5 MG/1; MG/1; MG/1; MG/1
30 TABLET ORAL 2 TIMES DAILY
Qty: 60 TABLET | Refills: 0 | Status: SHIPPED | OUTPATIENT
Start: 2022-06-03 | End: 2022-07-03

## 2022-06-03 RX ORDER — DEXTROAMPHETAMINE SACCHARATE, AMPHETAMINE ASPARTATE, DEXTROAMPHETAMINE SULFATE AND AMPHETAMINE SULFATE 7.5; 7.5; 7.5; 7.5 MG/1; MG/1; MG/1; MG/1
30 TABLET ORAL 2 TIMES DAILY
Qty: 60 TABLET | Refills: 0 | Status: CANCELLED | OUTPATIENT
Start: 2022-06-03 | End: 2022-07-03

## 2022-06-03 RX ORDER — DEXTROAMPHETAMINE SACCHARATE, AMPHETAMINE ASPARTATE, DEXTROAMPHETAMINE SULFATE AND AMPHETAMINE SULFATE 7.5; 7.5; 7.5; 7.5 MG/1; MG/1; MG/1; MG/1
30 TABLET ORAL 2 TIMES DAILY
Qty: 60 TABLET | Refills: 0 | Status: SHIPPED | OUTPATIENT
Start: 2022-08-04 | End: 2022-09-03

## 2022-06-03 RX ORDER — DEXTROAMPHETAMINE SACCHARATE, AMPHETAMINE ASPARTATE, DEXTROAMPHETAMINE SULFATE AND AMPHETAMINE SULFATE 7.5; 7.5; 7.5; 7.5 MG/1; MG/1; MG/1; MG/1
30 TABLET ORAL DAILY
Qty: 30 TABLET | Refills: 0 | Status: SHIPPED | OUTPATIENT
Start: 2022-06-03 | End: 2022-06-03

## 2022-06-03 RX ORDER — DEXTROAMPHETAMINE SACCHARATE, AMPHETAMINE ASPARTATE, DEXTROAMPHETAMINE SULFATE AND AMPHETAMINE SULFATE 7.5; 7.5; 7.5; 7.5 MG/1; MG/1; MG/1; MG/1
30 TABLET ORAL DAILY
Qty: 30 TABLET | Refills: 0 | Status: SHIPPED | OUTPATIENT
Start: 2022-07-04 | End: 2022-06-03 | Stop reason: DRUGHIGH

## 2022-06-03 NOTE — TELEPHONE ENCOUNTER
Last OV: 3/4/22    Patient advising the once a day Rx is not correct. I called [facility], they only had the once daily on file. Dr. Erin Robison, please review orders for BID Adderall.

## 2022-06-03 NOTE — TELEPHONE ENCOUNTER
amphetamine-dextroamphetamine (ADDERALL) 30 MG Oral Tab    [facility] [address] AT [address] [facility], [phone], [phone]    Patient going out of town tomorrow.

## 2022-07-05 ENCOUNTER — TELEPHONE (OUTPATIENT)
Dept: FAMILY MEDICINE CLINIC | Facility: CLINIC | Age: 52
End: 2022-07-05

## 2022-07-05 RX ORDER — DEXTROAMPHETAMINE SACCHARATE, AMPHETAMINE ASPARTATE, DEXTROAMPHETAMINE SULFATE AND AMPHETAMINE SULFATE 7.5; 7.5; 7.5; 7.5 MG/1; MG/1; MG/1; MG/1
30 TABLET ORAL 2 TIMES DAILY
Qty: 60 TABLET | Refills: 0 | Status: SHIPPED | OUTPATIENT
Start: 2022-07-05 | End: 2022-08-04

## 2022-07-05 NOTE — TELEPHONE ENCOUNTER
Pharmacy is out of stock on the adderall - pt asking for script to be sent to [facility] on [address]

## 2022-07-13 ENCOUNTER — APPOINTMENT (OUTPATIENT)
Dept: URBAN - METROPOLITAN AREA CLINIC 242 | Age: 52
Setting detail: DERMATOLOGY
End: 2022-07-13

## 2022-07-13 DIAGNOSIS — L70.0 ACNE VULGARIS: ICD-10-CM

## 2022-07-13 PROCEDURE — OTHER PRESCRIPTION: OTHER

## 2022-07-13 PROCEDURE — 99213 OFFICE O/P EST LOW 20 MIN: CPT | Mod: 25

## 2022-07-13 PROCEDURE — OTHER COUNSELING: OTHER

## 2022-07-13 PROCEDURE — 11901 INJECT SKIN LESIONS >7: CPT

## 2022-07-13 PROCEDURE — OTHER SEPARATE AND IDENTIFIABLE DOCUMENTATION: OTHER

## 2022-07-13 PROCEDURE — OTHER INTRALESIONAL KENALOG: OTHER

## 2022-07-13 PROCEDURE — OTHER PRESCRIPTION MEDICATION MANAGEMENT: OTHER

## 2022-07-13 RX ORDER — DOXYCYCLINE 100 MG/1
CAPSULE ORAL
Qty: 60 | Refills: 0 | Status: ERX | COMMUNITY
Start: 2022-07-13

## 2022-07-13 RX ORDER — CLINDAMYCIN PHOSPHATE 10 MG/G
GEL TOPICAL
Qty: 60 | Refills: 0 | Status: ERX | COMMUNITY
Start: 2022-07-13

## 2022-07-13 ASSESSMENT — LOCATION ZONE DERM
LOCATION ZONE: NOSE
LOCATION ZONE: FACE
LOCATION ZONE: LIP

## 2022-07-13 ASSESSMENT — LOCATION DETAILED DESCRIPTION DERM
LOCATION DETAILED: LEFT CENTRAL BUCCAL CHEEK
LOCATION DETAILED: LEFT MEDIAL MALAR CHEEK
LOCATION DETAILED: RIGHT CHIN
LOCATION DETAILED: LEFT INFERIOR CENTRAL MALAR CHEEK
LOCATION DETAILED: NASAL ROOT
LOCATION DETAILED: RIGHT CENTRAL BUCCAL CHEEK
LOCATION DETAILED: NASAL DORSUM
LOCATION DETAILED: RIGHT LOWER CUTANEOUS LIP
LOCATION DETAILED: LEFT INFERIOR CENTRAL BUCCAL CHEEK

## 2022-07-13 ASSESSMENT — LOCATION SIMPLE DESCRIPTION DERM
LOCATION SIMPLE: CHIN
LOCATION SIMPLE: RIGHT LIP
LOCATION SIMPLE: LEFT CHEEK
LOCATION SIMPLE: NOSE
LOCATION SIMPLE: RIGHT CHEEK

## 2022-07-13 NOTE — PROCEDURE: COUNSELING
Benzoyl Peroxide Pregnancy And Lactation Text: This medication is Pregnancy Category C. It is unknown if benzoyl peroxide is excreted in breast milk.
Include Pregnancy/Lactation Warning?: No
Sarecycline Pregnancy And Lactation Text: This medication is Pregnancy Category D and not consider safe during pregnancy. It is also excreted in breast milk.
Aklief Pregnancy And Lactation Text: It is unknown if this medication is safe to use during pregnancy.  It is unknown if this medication is excreted in breast milk.  Breastfeeding women should use the topical cream on the smallest area of the skin for the shortest time needed while breastfeeding.  Do not apply to nipple and areola.
Bactrim Pregnancy And Lactation Text: This medication is Pregnancy Category D and is known to cause fetal risk.  It is also excreted in breast milk.
Erythromycin Counseling:  I discussed with the patient the risks of erythromycin including but not limited to GI upset, allergic reaction, drug rash, diarrhea, increase in liver enzymes, and yeast infections.
Topical Retinoid counseling:  Patient advised to apply a pea-sized amount only at bedtime and wait 30 minutes after washing their face before applying.  If too drying, patient may add a non-comedogenic moisturizer. The patient verbalized understanding of the proper use and possible adverse effects of retinoids.  All of the patient's questions and concerns were addressed.
Isotretinoin Counseling: Patient should get monthly blood tests, not donate blood, not drive at night if vision affected, not share medication, and not undergo elective surgery for 6 months after tx completed. Side effects reviewed, pt to contact office should one occur.
Topical Clindamycin Counseling: Patient counseled that this medication may cause skin irritation or allergic reactions.  In the event of skin irritation, the patient was advised to reduce the amount of the drug applied or use it less frequently.   The patient verbalized understanding of the proper use and possible adverse effects of clindamycin.  All of the patient's questions and concerns were addressed.
Aklief counseling:  Patient advised to apply a pea-sized amount only at bedtime and wait 30 minutes after washing their face before applying.  If too drying, patient may add a non-comedogenic moisturizer.  The most commonly reported side effects including irritation, redness, scaling, dryness, stinging, burning, itching, and increased risk of sunburn.  The patient verbalized understanding of the proper use and possible adverse effects of retinoids.  All of the patient's questions and concerns were addressed.
Erythromycin Pregnancy And Lactation Text: This medication is Pregnancy Category B and is considered safe during pregnancy. It is also excreted in breast milk.
Azelaic Acid Counseling: Patient counseled that medicine may cause skin irritation and to avoid applying near the eyes.  In the event of skin irritation, the patient was advised to reduce the amount of the drug applied or use it less frequently.   The patient verbalized understanding of the proper use and possible adverse effects of azelaic acid.  All of the patient's questions and concerns were addressed.
Spironolactone Pregnancy And Lactation Text: This medication can cause feminization of the male fetus and should be avoided during pregnancy. The active metabolite is also found in breast milk.
Azelaic Acid Pregnancy And Lactation Text: This medication is considered safe during pregnancy and breast feeding.
Topical Retinoid Pregnancy And Lactation Text: This medication is Pregnancy Category C. It is unknown if this medication is excreted in breast milk.
Doxycycline Counseling:  Patient counseled regarding possible photosensitivity and increased risk for sunburn.  Patient instructed to avoid sunlight, if possible.  When exposed to sunlight, patients should wear protective clothing, sunglasses, and sunscreen.  The patient was instructed to call the office immediately if the following severe adverse effects occur:  hearing changes, easy bruising/bleeding, severe headache, or vision changes.  The patient verbalized understanding of the proper use and possible adverse effects of doxycycline.  All of the patient's questions and concerns were addressed.
Birth Control Pills Counseling: Birth Control Pill Counseling: I discussed with the patient the potential side effects of OCPs including but not limited to increased risk of stroke, heart attack, thrombophlebitis, deep venous thrombosis, hepatic adenomas, breast changes, GI upset, headaches, and depression.  The patient verbalized understanding of the proper use and possible adverse effects of OCPs. All of the patient's questions and concerns were addressed.
Bactrim Counseling:  I discussed with the patient the risks of sulfa antibiotics including but not limited to GI upset, allergic reaction, drug rash, diarrhea, dizziness, photosensitivity, and yeast infections.  Rarely, more serious reactions can occur including but not limited to aplastic anemia, agranulocytosis, methemoglobinemia, blood dyscrasias, liver or kidney failure, lung infiltrates or desquamative/blistering drug rashes.
Azithromycin Pregnancy And Lactation Text: This medication is considered safe during pregnancy and is also secreted in breast milk.
Dapsone Counseling: I discussed with the patient the risks of dapsone including but not limited to hemolytic anemia, agranulocytosis, rashes, methemoglobinemia, kidney failure, peripheral neuropathy, headaches, GI upset, and liver toxicity.  Patients who start dapsone require monitoring including baseline LFTs and weekly CBCs for the first month, then every month thereafter.  The patient verbalized understanding of the proper use and possible adverse effects of dapsone.  All of the patient's questions and concerns were addressed.
Topical Clindamycin Pregnancy And Lactation Text: This medication is Pregnancy Category B and is considered safe during pregnancy. It is unknown if it is excreted in breast milk.
Sarecycline Counseling: Patient advised regarding possible photosensitivity and discoloration of the teeth, skin, lips, tongue and gums.  Patient instructed to avoid sunlight, if possible.  When exposed to sunlight, patients should wear protective clothing, sunglasses, and sunscreen.  The patient was instructed to call the office immediately if the following severe adverse effects occur:  hearing changes, easy bruising/bleeding, severe headache, or vision changes.  The patient verbalized understanding of the proper use and possible adverse effects of sarecycline.  All of the patient's questions and concerns were addressed.
Winlevi Counseling:  I discussed with the patient the risks of topical clascoterone including but not limited to erythema, scaling, itching, and stinging. Patient voiced their understanding.
Tazorac Counseling:  Patient advised that medication is irritating and drying.  Patient may need to apply sparingly and wash off after an hour before eventually leaving it on overnight.  The patient verbalized understanding of the proper use and possible adverse effects of tazorac.  All of the patient's questions and concerns were addressed.
Doxycycline Pregnancy And Lactation Text: This medication is Pregnancy Category D and not consider safe during pregnancy. It is also excreted in breast milk but is considered safe for shorter treatment courses.
Isotretinoin Pregnancy And Lactation Text: This medication is Pregnancy Category X and is considered extremely dangerous during pregnancy. It is unknown if it is excreted in breast milk.
Winlevi Pregnancy And Lactation Text: This medication is considered safe during pregnancy and breastfeeding.
High Dose Vitamin A Pregnancy And Lactation Text: High dose vitamin A therapy is contraindicated during pregnancy and breast feeding.
Topical Sulfur Applications Counseling: Topical Sulfur Counseling: Patient counseled that this medication may cause skin irritation or allergic reactions.  In the event of skin irritation, the patient was advised to reduce the amount of the drug applied or use it less frequently.   The patient verbalized understanding of the proper use and possible adverse effects of topical sulfur application.  All of the patient's questions and concerns were addressed.
Benzoyl Peroxide Counseling: Patient counseled that medicine may cause skin irritation and bleach clothing.  In the event of skin irritation, the patient was advised to reduce the amount of the drug applied or use it less frequently.   The patient verbalized understanding of the proper use and possible adverse effects of benzoyl peroxide.  All of the patient's questions and concerns were addressed.
Topical Sulfur Applications Pregnancy And Lactation Text: This medication is Pregnancy Category C and has an unknown safety profile during pregnancy. It is unknown if this topical medication is excreted in breast milk.
Minocycline Counseling: Patient advised regarding possible photosensitivity and discoloration of the teeth, skin, lips, tongue and gums.  Patient instructed to avoid sunlight, if possible.  When exposed to sunlight, patients should wear protective clothing, sunglasses, and sunscreen.  The patient was instructed to call the office immediately if the following severe adverse effects occur:  hearing changes, easy bruising/bleeding, severe headache, or vision changes.  The patient verbalized understanding of the proper use and possible adverse effects of minocycline.  All of the patient's questions and concerns were addressed.
Tazorac Pregnancy And Lactation Text: This medication is not safe during pregnancy. It is unknown if this medication is excreted in breast milk.
High Dose Vitamin A Counseling: Side effects reviewed, pt to contact office should one occur.
Tetracycline Counseling: Patient counseled regarding possible photosensitivity and increased risk for sunburn.  Patient instructed to avoid sunlight, if possible.  When exposed to sunlight, patients should wear protective clothing, sunglasses, and sunscreen.  The patient was instructed to call the office immediately if the following severe adverse effects occur:  hearing changes, easy bruising/bleeding, severe headache, or vision changes.  The patient verbalized understanding of the proper use and possible adverse effects of tetracycline.  All of the patient's questions and concerns were addressed. Patient understands to avoid pregnancy while on therapy due to potential birth defects.
Birth Control Pills Pregnancy And Lactation Text: This medication should be avoided if pregnant and for the first 30 days post-partum.
Spironolactone Counseling: Patient advised regarding risks of diarrhea, abdominal pain, hyperkalemia, birth defects (for female patients), liver toxicity and renal toxicity. The patient may need blood work to monitor liver and kidney function and potassium levels while on therapy. The patient verbalized understanding of the proper use and possible adverse effects of spironolactone.  All of the patient's questions and concerns were addressed.
Dapsone Pregnancy And Lactation Text: This medication is Pregnancy Category C and is not considered safe during pregnancy or breast feeding.
Detail Level: Zone
Azithromycin Counseling:  I discussed with the patient the risks of azithromycin including but not limited to GI upset, allergic reaction, drug rash, diarrhea, and yeast infections.

## 2022-07-13 NOTE — PROCEDURE: PRESCRIPTION MEDICATION MANAGEMENT
Continue Regimen: Tretinoin cream,qhs. Patient has at home.
Render In Strict Bullet Format?: No
Detail Level: Zone
Initiate Treatment: Clindamycin gel,DCN

## 2022-08-30 ENCOUNTER — PATIENT MESSAGE (OUTPATIENT)
Dept: FAMILY MEDICINE CLINIC | Facility: CLINIC | Age: 52
End: 2022-08-30

## 2022-08-30 RX ORDER — DEXTROAMPHETAMINE SACCHARATE, AMPHETAMINE ASPARTATE, DEXTROAMPHETAMINE SULFATE AND AMPHETAMINE SULFATE 7.5; 7.5; 7.5; 7.5 MG/1; MG/1; MG/1; MG/1
30 TABLET ORAL 2 TIMES DAILY
Qty: 60 TABLET | Refills: 0 | Status: SHIPPED | OUTPATIENT
Start: 2022-08-30 | End: 2022-09-29

## 2022-10-01 ENCOUNTER — PATIENT MESSAGE (OUTPATIENT)
Dept: FAMILY MEDICINE CLINIC | Facility: CLINIC | Age: 52
End: 2022-10-01

## 2022-10-03 ENCOUNTER — TELEPHONE (OUTPATIENT)
Dept: FAMILY MEDICINE CLINIC | Facility: CLINIC | Age: 52
End: 2022-10-03

## 2022-10-03 ENCOUNTER — PATIENT MESSAGE (OUTPATIENT)
Dept: FAMILY MEDICINE CLINIC | Facility: CLINIC | Age: 52
End: 2022-10-03

## 2022-10-03 RX ORDER — DEXTROAMPHETAMINE SACCHARATE, AMPHETAMINE ASPARTATE, DEXTROAMPHETAMINE SULFATE AND AMPHETAMINE SULFATE 7.5; 7.5; 7.5; 7.5 MG/1; MG/1; MG/1; MG/1
30 TABLET ORAL 2 TIMES DAILY
Qty: 60 TABLET | Refills: 0 | Status: SHIPPED | OUTPATIENT
Start: 2022-10-03 | End: 2022-10-03

## 2022-10-03 RX ORDER — DEXTROAMPHETAMINE SACCHARATE, AMPHETAMINE ASPARTATE, DEXTROAMPHETAMINE SULFATE AND AMPHETAMINE SULFATE 7.5; 7.5; 7.5; 7.5 MG/1; MG/1; MG/1; MG/1
30 TABLET ORAL DAILY
Qty: 30 TABLET | Refills: 0 | Status: SHIPPED | OUTPATIENT
Start: 2022-11-30 | End: 2022-12-30

## 2022-10-03 RX ORDER — DEXTROAMPHETAMINE SACCHARATE, AMPHETAMINE ASPARTATE, DEXTROAMPHETAMINE SULFATE AND AMPHETAMINE SULFATE 7.5; 7.5; 7.5; 7.5 MG/1; MG/1; MG/1; MG/1
30 TABLET ORAL 2 TIMES DAILY
Qty: 60 TABLET | Refills: 0 | Status: SHIPPED | OUTPATIENT
Start: 2022-10-03 | End: 2022-11-02

## 2022-10-03 RX ORDER — DEXTROAMPHETAMINE SACCHARATE, AMPHETAMINE ASPARTATE, DEXTROAMPHETAMINE SULFATE AND AMPHETAMINE SULFATE 7.5; 7.5; 7.5; 7.5 MG/1; MG/1; MG/1; MG/1
30 TABLET ORAL 2 TIMES DAILY
Qty: 60 TABLET | Refills: 0 | Status: SHIPPED | OUTPATIENT
Start: 2022-11-01 | End: 2022-10-03

## 2022-10-03 NOTE — TELEPHONE ENCOUNTER
Approve/deny adderall  Last filled 8/30/22  Last OV 3/4/22
From: Dora Fierro  To: Prasanth Lopez MD  Sent: 10/1/2022 2:12 PM CDT  Subject: Refill    Good afternoon,   It looks like only one script for my D-amphetamine 30mg twice daily was sent to [facility], which was septembers script. Could my script for October please be sent to [facility] so I can refill.    Thank you,  Fritz Heath
Yes

## 2022-10-03 NOTE — TELEPHONE ENCOUNTER
amphetamine-dextroamphetamine (ADDERALL) 30 MG Oral Tab       She needs this resent to [address] [facility] is out of the med

## 2022-10-03 NOTE — TELEPHONE ENCOUNTER
From: Sandra Braun  To: Jackelyn Power MD  Sent: 10/3/2022 3:42 PM CDT  Subject: Adderall    It looks like the script just sent to [facility] in [place] is incorrect. It should be 30mg twice daily.

## 2022-11-01 RX ORDER — DEXTROAMPHETAMINE SACCHARATE, AMPHETAMINE ASPARTATE, DEXTROAMPHETAMINE SULFATE AND AMPHETAMINE SULFATE 7.5; 7.5; 7.5; 7.5 MG/1; MG/1; MG/1; MG/1
30 TABLET ORAL 2 TIMES DAILY
Qty: 60 TABLET | Refills: 0 | Status: SHIPPED | OUTPATIENT
Start: 2022-11-01 | End: 2022-12-01

## 2022-11-01 NOTE — TELEPHONE ENCOUNTER
REFILL ADDERALL 30 MG TABS  BID    [facility] [id number] [address]  [place]    NEEDS RX TRANSFERRED TO [facility] DUE TO HER OTHER PHARM NOT HAVING MEDS.

## 2022-11-22 NOTE — LETTER
Duplicate request removed  Obstetrics and Gynecology    Dr. Chilo Manrique, APN   [address] [facility] [address], [address]  [place]

## 2022-12-05 NOTE — TELEPHONE ENCOUNTER
RC-  Patient requesting refill:    amphetamine-dextroamphetamine (ADDERALL) 30 MG Oral Tab  60 tablet       Last OV 3/4/22  Last refill 11/1/22    Medication pended for review and sig

## 2022-12-06 ENCOUNTER — TELEPHONE (OUTPATIENT)
Dept: FAMILY MEDICINE CLINIC | Facility: CLINIC | Age: 52
End: 2022-12-06

## 2022-12-06 ENCOUNTER — PATIENT MESSAGE (OUTPATIENT)
Dept: FAMILY MEDICINE CLINIC | Facility: CLINIC | Age: 52
End: 2022-12-06

## 2022-12-06 RX ORDER — DEXTROAMPHETAMINE SACCHARATE, AMPHETAMINE ASPARTATE, DEXTROAMPHETAMINE SULFATE AND AMPHETAMINE SULFATE 7.5; 7.5; 7.5; 7.5 MG/1; MG/1; MG/1; MG/1
30 TABLET ORAL 2 TIMES DAILY
Qty: 60 TABLET | Refills: 0 | Status: SHIPPED | OUTPATIENT
Start: 2022-12-06 | End: 2022-12-06

## 2022-12-06 RX ORDER — DEXTROAMPHETAMINE SACCHARATE, AMPHETAMINE ASPARTATE, DEXTROAMPHETAMINE SULFATE AND AMPHETAMINE SULFATE 7.5; 7.5; 7.5; 7.5 MG/1; MG/1; MG/1; MG/1
30 TABLET ORAL 2 TIMES DAILY
Qty: 60 TABLET | Refills: 0 | Status: SHIPPED | OUTPATIENT
Start: 2022-12-06 | End: 2023-01-05

## 2022-12-06 NOTE — TELEPHONE ENCOUNTER
From: Doroteo Andre  To: Yoni Andrade MD  Sent: 12/6/2022 8:27 AM CST  Subject: Prescription     The script should have been sent to [address] [place] [address]. That was the address I gave in my message.   Thank you

## 2022-12-29 NOTE — TELEPHONE ENCOUNTER
Fax recd from [address] [place] [address] in regards to Adderall 30mg. Request is only partially approved. Addtl quantities was denied.

## 2023-01-04 ENCOUNTER — PATIENT MESSAGE (OUTPATIENT)
Dept: FAMILY MEDICINE CLINIC | Facility: CLINIC | Age: 53
End: 2023-01-04

## 2023-01-05 RX ORDER — DEXTROAMPHETAMINE SACCHARATE, AMPHETAMINE ASPARTATE, DEXTROAMPHETAMINE SULFATE AND AMPHETAMINE SULFATE 7.5; 7.5; 7.5; 7.5 MG/1; MG/1; MG/1; MG/1
30 TABLET ORAL 2 TIMES DAILY
Qty: 60 TABLET | Refills: 0 | Status: SHIPPED | OUTPATIENT
Start: 2023-01-05 | End: 2023-02-04

## 2023-01-05 RX ORDER — DEXTROAMPHETAMINE SACCHARATE, AMPHETAMINE ASPARTATE, DEXTROAMPHETAMINE SULFATE AND AMPHETAMINE SULFATE 7.5; 7.5; 7.5; 7.5 MG/1; MG/1; MG/1; MG/1
30 TABLET ORAL 2 TIMES DAILY
Qty: 60 TABLET | Refills: 0 | Status: SHIPPED | OUTPATIENT
Start: 2023-02-05 | End: 2023-03-07

## 2023-02-01 DIAGNOSIS — F34.1 DYSTHYMIA: ICD-10-CM

## 2023-02-01 RX ORDER — AMITRIPTYLINE HYDROCHLORIDE 50 MG/1
TABLET, FILM COATED ORAL
Qty: 90 TABLET | Refills: 0 | Status: SHIPPED | OUTPATIENT
Start: 2023-02-01

## 2023-03-02 PROBLEM — F33.0 MAJOR DEPRESSIVE DISORDER, RECURRENT, MILD: Status: ACTIVE | Noted: 2023-03-02

## 2023-03-02 PROBLEM — F33.0 MAJOR DEPRESSIVE DISORDER, RECURRENT, MILD (HCC): Status: ACTIVE | Noted: 2023-03-02

## 2023-03-03 ENCOUNTER — HOSPITAL ENCOUNTER (OUTPATIENT)
Dept: GENERAL RADIOLOGY | Age: 53
Discharge: HOME | End: 2023-03-03
Attending: FAMILY MEDICINE
Payer: COMMERCIAL

## 2023-03-03 DIAGNOSIS — H53.9 VISION CHANGES: ICD-10-CM

## 2023-03-03 DIAGNOSIS — S09.93XA FACIAL TRAUMA, INITIAL ENCOUNTER: ICD-10-CM

## 2023-03-03 PROCEDURE — 70200 X-RAY EXAM OF EYE SOCKETS: CPT | Performed by: FAMILY MEDICINE

## 2023-05-08 DIAGNOSIS — F34.1 DYSTHYMIA: ICD-10-CM

## 2023-05-08 RX ORDER — AMITRIPTYLINE HYDROCHLORIDE 50 MG/1
TABLET, FILM COATED ORAL
Qty: 90 TABLET | Refills: 0 | Status: SHIPPED | OUTPATIENT
Start: 2023-05-08

## 2023-06-06 ENCOUNTER — PATIENT MESSAGE (OUTPATIENT)
Dept: FAMILY MEDICINE CLINIC | Facility: CLINIC | Age: 53
End: 2023-06-06

## 2023-06-06 NOTE — TELEPHONE ENCOUNTER
From: Marilee Javier  To: Александр Rao MD  Sent: 6/6/2023 10:37 AM CDT  Subject: Prescriptions     Hello, I will need to refill my Adderal 30mg twice daily prescription soon. Can I stop by to  the script to be filled?   Thank you

## 2023-06-07 RX ORDER — DEXTROAMPHETAMINE SACCHARATE, AMPHETAMINE ASPARTATE, DEXTROAMPHETAMINE SULFATE AND AMPHETAMINE SULFATE 7.5; 7.5; 7.5; 7.5 MG/1; MG/1; MG/1; MG/1
30 TABLET ORAL 2 TIMES DAILY
Qty: 60 TABLET | Refills: 0 | Status: SHIPPED | OUTPATIENT
Start: 2023-07-08 | End: 2023-08-07

## 2023-06-07 RX ORDER — DEXTROAMPHETAMINE SACCHARATE, AMPHETAMINE ASPARTATE, DEXTROAMPHETAMINE SULFATE AND AMPHETAMINE SULFATE 7.5; 7.5; 7.5; 7.5 MG/1; MG/1; MG/1; MG/1
30 TABLET ORAL 2 TIMES DAILY
Qty: 60 TABLET | Refills: 0 | Status: SHIPPED | OUTPATIENT
Start: 2023-06-07 | End: 2023-07-07

## 2023-06-07 RX ORDER — DEXTROAMPHETAMINE SACCHARATE, AMPHETAMINE ASPARTATE, DEXTROAMPHETAMINE SULFATE AND AMPHETAMINE SULFATE 7.5; 7.5; 7.5; 7.5 MG/1; MG/1; MG/1; MG/1
30 TABLET ORAL 2 TIMES DAILY
Qty: 60 TABLET | Refills: 0 | Status: SHIPPED | OUTPATIENT
Start: 2023-08-08 | End: 2023-09-07

## 2023-06-07 RX ORDER — DEXTROAMPHETAMINE SACCHARATE, AMPHETAMINE ASPARTATE, DEXTROAMPHETAMINE SULFATE AND AMPHETAMINE SULFATE 7.5; 7.5; 7.5; 7.5 MG/1; MG/1; MG/1; MG/1
30 TABLET ORAL 2 TIMES DAILY
Qty: 60 TABLET | Refills: 0 | Status: SHIPPED | OUTPATIENT
Start: 2023-06-07 | End: 2023-06-07

## 2023-06-07 NOTE — TELEPHONE ENCOUNTER
Dr. Nhan Mosley,  See refill request for .   Last refill  3/2/23  Last office visit 3/2/23  Medication pended for script print

## 2023-06-08 RX ORDER — DEXTROAMPHETAMINE SACCHARATE, AMPHETAMINE ASPARTATE, DEXTROAMPHETAMINE SULFATE AND AMPHETAMINE SULFATE 7.5; 7.5; 7.5; 7.5 MG/1; MG/1; MG/1; MG/1
30 TABLET ORAL 2 TIMES DAILY
Qty: 60 TABLET | Refills: 0 | Status: SHIPPED | OUTPATIENT
Start: 2023-06-08 | End: 2023-07-08

## 2023-06-08 RX ORDER — DEXTROAMPHETAMINE SACCHARATE, AMPHETAMINE ASPARTATE, DEXTROAMPHETAMINE SULFATE AND AMPHETAMINE SULFATE 7.5; 7.5; 7.5; 7.5 MG/1; MG/1; MG/1; MG/1
30 TABLET ORAL 2 TIMES DAILY
Qty: 60 TABLET | Refills: 0 | Status: SHIPPED | OUTPATIENT
Start: 2023-07-09 | End: 2023-08-08

## 2023-06-08 RX ORDER — DEXTROAMPHETAMINE SACCHARATE, AMPHETAMINE ASPARTATE, DEXTROAMPHETAMINE SULFATE AND AMPHETAMINE SULFATE 7.5; 7.5; 7.5; 7.5 MG/1; MG/1; MG/1; MG/1
30 TABLET ORAL 2 TIMES DAILY
Qty: 60 TABLET | Refills: 0 | Status: SHIPPED | OUTPATIENT
Start: 2023-08-09 | End: 2023-09-08

## 2023-08-06 DIAGNOSIS — F34.1 DYSTHYMIA: ICD-10-CM

## 2023-08-07 RX ORDER — AMITRIPTYLINE HYDROCHLORIDE 50 MG/1
50 TABLET, FILM COATED ORAL NIGHTLY
Qty: 90 TABLET | Refills: 0 | Status: SHIPPED | OUTPATIENT
Start: 2023-08-07

## 2023-08-07 NOTE — TELEPHONE ENCOUNTER
A refill request was received for:  Requested Prescriptions     Pending Prescriptions Disp Refills    AMITRIPTYLINE 50 MG Oral Tab [Pharmacy Med Name: AMITRIPTYLINE 50MG TABLETS] 90 tablet 0     Sig: TAKE 1 TABLET(50 MG) BY MOUTH EVERY NIGHT       Last refill date:5/8/2023       Last office visit:3/2/2023     Follow up due:  No future appointments.

## 2023-09-06 ENCOUNTER — DOCUMENTATION ONLY (OUTPATIENT)
Dept: FAMILY MEDICINE CLINIC | Facility: CLINIC | Age: 53
End: 2023-09-06

## 2023-09-06 ENCOUNTER — PATIENT MESSAGE (OUTPATIENT)
Dept: FAMILY MEDICINE CLINIC | Facility: CLINIC | Age: 53
End: 2023-09-06

## 2023-09-06 RX ORDER — DEXTROAMPHETAMINE SACCHARATE, AMPHETAMINE ASPARTATE, DEXTROAMPHETAMINE SULFATE AND AMPHETAMINE SULFATE 7.5; 7.5; 7.5; 7.5 MG/1; MG/1; MG/1; MG/1
30 TABLET ORAL 2 TIMES DAILY
Qty: 60 TABLET | Refills: 0 | Status: SHIPPED | OUTPATIENT
Start: 2023-09-06 | End: 2023-10-06

## 2023-09-06 RX ORDER — DEXTROAMPHETAMINE SACCHARATE, AMPHETAMINE ASPARTATE, DEXTROAMPHETAMINE SULFATE AND AMPHETAMINE SULFATE 7.5; 7.5; 7.5; 7.5 MG/1; MG/1; MG/1; MG/1
30 TABLET ORAL 2 TIMES DAILY
Qty: 60 TABLET | Refills: 0 | Status: SHIPPED | OUTPATIENT
Start: 2023-11-07 | End: 2023-12-07

## 2023-09-06 RX ORDER — DEXTROAMPHETAMINE SACCHARATE, AMPHETAMINE ASPARTATE, DEXTROAMPHETAMINE SULFATE AND AMPHETAMINE SULFATE 7.5; 7.5; 7.5; 7.5 MG/1; MG/1; MG/1; MG/1
30 TABLET ORAL 2 TIMES DAILY
Qty: 60 TABLET | Refills: 0 | Status: SHIPPED | OUTPATIENT
Start: 2023-10-07 | End: 2023-11-06

## 2023-09-06 NOTE — TELEPHONE ENCOUNTER
From: Elizabet Chawla  To: Rubens Hardwick MD  Sent: 9/6/2023 10:30 AM CDT  Subject: My prescription     Good morning,  I do not have any refills left for D- Amphetamine/Adderall 30mg tabs twice daily. It will need to be refilled tomorrow.   Thank you,  Tonie Ko

## 2023-09-06 NOTE — TELEPHONE ENCOUNTER
Last OV: 3/2/23  No future appt  Last refill: 8/9/23  Patient wants scripts printed.    Approve/deny:       Ok to order mammogram?

## 2023-09-29 ENCOUNTER — APPOINTMENT (OUTPATIENT)
Dept: URBAN - METROPOLITAN AREA CLINIC 247 | Age: 53
Setting detail: DERMATOLOGY
End: 2023-09-29

## 2023-09-29 DIAGNOSIS — Z41.9 ENCOUNTER FOR PROCEDURE FOR PURPOSES OTHER THAN REMEDYING HEALTH STATE, UNSPECIFIED: ICD-10-CM

## 2023-09-29 PROCEDURE — OTHER BOTOX: OTHER

## 2023-09-29 NOTE — PROCEDURE: BOTOX
Inferior Lateral Orbicularis Oculi Units: 0
Show Depressor Anguli Units: Yes
Show Right And Left Brow Units: No
Periorbital Skin Units: 12
Incrementing Botox Units: By 0.5 Units
Detail Level: Detailed
Forehead Units: 28
Post-Care Instructions: Patient instructed to not lie down for 4 hours and limit physical activity for 24 hours.
Dilution (U/0.1 Cc): 2.5
Show Inventory Tab: Show
Consent: Written consent obtained. Risks include but not limited to lid/brow ptosis, bruising, swelling, diplopia, temporary effect, incomplete chemical denervation.
Glabellar Complex Units: 20

## 2023-12-03 DIAGNOSIS — F34.1 DYSTHYMIA: ICD-10-CM

## 2023-12-04 PROBLEM — H40.1132 PRIMARY OPEN ANGLE GLAUCOMA (POAG) OF BOTH EYES, MODERATE STAGE: Status: ACTIVE | Noted: 2023-12-04

## 2023-12-04 RX ORDER — AMITRIPTYLINE HYDROCHLORIDE 50 MG/1
50 TABLET, FILM COATED ORAL NIGHTLY
Qty: 90 TABLET | Refills: 0 | Status: SHIPPED | OUTPATIENT
Start: 2023-12-04

## 2023-12-04 NOTE — TELEPHONE ENCOUNTER
A refill request was received for:  Requested Prescriptions     Pending Prescriptions Disp Refills    AMITRIPTYLINE 50 MG Oral Tab [Pharmacy Med Name: AMITRIPTYLINE 50MG TABLETS] 90 tablet 0     Sig: TAKE 1 TABLET(50 MG) BY MOUTH EVERY NIGHT       Last refill date:8/7/2023       Last office visit:3/2/2023    Follow up due:  Future Appointments   Date Time Provider Jericho Pichardo   12/4/2023 12:30 PM Gael Calhoun MD [facility] [address]

## 2024-02-06 DIAGNOSIS — I10 BENIGN HYPERTENSION: ICD-10-CM

## 2024-02-06 RX ORDER — LISINOPRIL 20 MG/1
20 TABLET ORAL DAILY
Qty: 30 TABLET | Refills: 0 | Status: SHIPPED | OUTPATIENT
Start: 2024-02-06

## 2024-02-06 NOTE — TELEPHONE ENCOUNTER
A refill request was received for:  Requested Prescriptions     Pending Prescriptions Disp Refills    LISINOPRIL 20 MG Oral Tab [Pharmacy Med Name: LISINOPRIL 20MG TABLETS] 30 tablet 0     Sig: TAKE 1 TABLET(20 MG) BY MOUTH DAILY       Last refill date:3/2/2023       Last office visit: 12/4/2023    Follow up due:  No future appointments.

## 2024-03-07 DIAGNOSIS — I10 BENIGN HYPERTENSION: ICD-10-CM

## 2024-03-07 DIAGNOSIS — F98.8 ATTENTION DEFICIT DISORDER, UNSPECIFIED HYPERACTIVITY PRESENCE: ICD-10-CM

## 2024-03-07 DIAGNOSIS — F34.1 DYSTHYMIA: ICD-10-CM

## 2024-03-07 RX ORDER — DEXTROAMPHETAMINE SACCHARATE, AMPHETAMINE ASPARTATE, DEXTROAMPHETAMINE SULFATE AND AMPHETAMINE SULFATE 7.5; 7.5; 7.5; 7.5 MG/1; MG/1; MG/1; MG/1
30 TABLET ORAL 2 TIMES DAILY
Qty: 60 TABLET | Refills: 0 | Status: SHIPPED | OUTPATIENT
Start: 2024-05-06 | End: 2024-06-05

## 2024-03-07 RX ORDER — AMITRIPTYLINE HYDROCHLORIDE 50 MG/1
50 TABLET, FILM COATED ORAL NIGHTLY
Qty: 90 TABLET | Refills: 0 | Status: SHIPPED | OUTPATIENT
Start: 2024-03-07

## 2024-03-07 RX ORDER — DEXTROAMPHETAMINE SACCHARATE, AMPHETAMINE ASPARTATE, DEXTROAMPHETAMINE SULFATE AND AMPHETAMINE SULFATE 7.5; 7.5; 7.5; 7.5 MG/1; MG/1; MG/1; MG/1
30 TABLET ORAL 2 TIMES DAILY
Qty: 60 TABLET | Refills: 0 | Status: SHIPPED | OUTPATIENT
Start: 2024-03-07 | End: 2024-03-07

## 2024-03-07 RX ORDER — DEXTROAMPHETAMINE SACCHARATE, AMPHETAMINE ASPARTATE, DEXTROAMPHETAMINE SULFATE AND AMPHETAMINE SULFATE 7.5; 7.5; 7.5; 7.5 MG/1; MG/1; MG/1; MG/1
30 TABLET ORAL 2 TIMES DAILY
Qty: 60 TABLET | Refills: 0 | Status: SHIPPED | OUTPATIENT
Start: 2024-04-05 | End: 2024-05-05

## 2024-03-07 RX ORDER — LISINOPRIL 20 MG/1
20 TABLET ORAL DAILY
Qty: 30 TABLET | Refills: 0 | Status: SHIPPED | OUTPATIENT
Start: 2024-03-07

## 2024-03-07 RX ORDER — DEXTROAMPHETAMINE SACCHARATE, AMPHETAMINE ASPARTATE, DEXTROAMPHETAMINE SULFATE AND AMPHETAMINE SULFATE 7.5; 7.5; 7.5; 7.5 MG/1; MG/1; MG/1; MG/1
30 TABLET ORAL 2 TIMES DAILY
Qty: 60 TABLET | Refills: 0 | Status: SHIPPED | OUTPATIENT
Start: 2024-03-07 | End: 2024-04-06

## 2024-03-07 NOTE — TELEPHONE ENCOUNTER
[facility] is out of this medication as is all [facility] within 40 mile area. Can you print this month so she can take elsewhere

## 2024-03-07 NOTE — TELEPHONE ENCOUNTER
A refill request was received for:  Requested Prescriptions     Pending Prescriptions Disp Refills    LISINOPRIL 20 MG Oral Tab [Pharmacy Med Name: LISINOPRIL 20MG TABLETS] 30 tablet 0     Sig: TAKE 1 TABLET(20 MG) BY MOUTH DAILY       Last refill date:2/6/2024       Last office visit:12/4/2023     Follow up due:  No future appointments.

## 2024-03-07 NOTE — TELEPHONE ENCOUNTER
A refill request was received for:  Requested Prescriptions     Pending Prescriptions Disp Refills    AMITRIPTYLINE 50 MG Oral Tab [Pharmacy Med Name: AMITRIPTYLINE 50MG TABLETS] 90 tablet 0     Sig: TAKE 1 TABLET(50 MG) BY MOUTH EVERY NIGHT       Last refill date:12/4/2023       Last office visit:12/4/2023    Follow up due:  No future appointments.

## 2024-05-08 DIAGNOSIS — F34.1 DYSTHYMIA: ICD-10-CM

## 2024-05-08 RX ORDER — AMITRIPTYLINE HYDROCHLORIDE 50 MG/1
50 TABLET, FILM COATED ORAL NIGHTLY
Qty: 90 TABLET | Refills: 0 | Status: SHIPPED | OUTPATIENT
Start: 2024-05-08

## 2024-05-08 NOTE — TELEPHONE ENCOUNTER
A refill request was received for:  Requested Prescriptions     Pending Prescriptions Disp Refills    AMITRIPTYLINE 50 MG Oral Tab [Pharmacy Med Name: AMITRIPTYLINE 50MG TABLETS] 90 tablet 0     Sig: TAKE 1 TABLET(50 MG) BY MOUTH EVERY NIGHT       Last refill date:   3/7/2024    Last office visit:   12/03/2023

## 2024-05-30 ENCOUNTER — PATIENT MESSAGE (OUTPATIENT)
Dept: FAMILY MEDICINE CLINIC | Facility: CLINIC | Age: 54
End: 2024-05-30

## 2024-05-30 DIAGNOSIS — F98.8 ATTENTION DEFICIT DISORDER, UNSPECIFIED HYPERACTIVITY PRESENCE: ICD-10-CM

## 2024-05-30 RX ORDER — DEXTROAMPHETAMINE SACCHARATE, AMPHETAMINE ASPARTATE, DEXTROAMPHETAMINE SULFATE AND AMPHETAMINE SULFATE 7.5; 7.5; 7.5; 7.5 MG/1; MG/1; MG/1; MG/1
30 TABLET ORAL 2 TIMES DAILY
Qty: 60 TABLET | Refills: 0 | Status: SHIPPED | OUTPATIENT
Start: 2024-05-30 | End: 2024-06-29

## 2024-05-30 NOTE — TELEPHONE ENCOUNTER
LOV 12/4/23  Future Appointments   Date Time Provider Department Center   6/10/2024 11:00 AM Rylan Calhoun MD [facility] [address] [address]     Last Rx per  filled 5/6/24 #60    Pt is asking for 30 day fill since she'll be out before her appt. She would like paper Rx. Pended if agreeable.

## 2024-05-30 NOTE — TELEPHONE ENCOUNTER
From: Shawna Aggarwal  To: Rylan Calhoun  Sent: 5/30/2024 11:33 AM CDT  Subject: Refill    Good morning. I’m scheduled for a video appointment on June 10th, I should have scheduled this appointment sooner. I will need to refill my adderall prescription on June 4th, but don’t have any paper scripts left. Can I stop by to pick one up prior to June 4th?  Thank you,  Shawna Aggarwal

## 2024-06-03 DIAGNOSIS — I10 BENIGN HYPERTENSION: ICD-10-CM

## 2024-06-03 RX ORDER — LISINOPRIL 20 MG/1
20 TABLET ORAL DAILY
Qty: 30 TABLET | Refills: 0 | Status: SHIPPED | OUTPATIENT
Start: 2024-06-03

## 2024-06-03 NOTE — TELEPHONE ENCOUNTER
A refill request was received for:  Requested Prescriptions     Pending Prescriptions Disp Refills    LISINOPRIL 20 MG Oral Tab [Pharmacy Med Name: LISINOPRIL 20MG TABLETS] 30 tablet 0     Sig: TAKE 1 TABLET(20 MG) BY MOUTH DAILY       Last refill date: 3/7/2024      Last office visit: 12/4/2023    Follow up due:  Future Appointments   Date Time Provider Department Center   6/10/2024 11:00 AM Rylan Calhoun MD [facility]

## 2024-06-04 ENCOUNTER — DOCUMENTATION ONLY (OUTPATIENT)
Dept: FAMILY MEDICINE CLINIC | Facility: CLINIC | Age: 54
End: 2024-06-04

## 2024-10-02 ENCOUNTER — PATIENT MESSAGE (OUTPATIENT)
Dept: FAMILY MEDICINE CLINIC | Facility: CLINIC | Age: 54
End: 2024-10-02

## 2024-10-02 DIAGNOSIS — F98.8 ATTENTION DEFICIT DISORDER (ADD) WITHOUT HYPERACTIVITY: ICD-10-CM

## 2024-10-02 RX ORDER — DEXTROAMPHETAMINE SACCHARATE, AMPHETAMINE ASPARTATE, DEXTROAMPHETAMINE SULFATE AND AMPHETAMINE SULFATE 7.5; 7.5; 7.5; 7.5 MG/1; MG/1; MG/1; MG/1
30 TABLET ORAL 2 TIMES DAILY
Qty: 60 TABLET | Refills: 0 | Status: SHIPPED | OUTPATIENT
Start: 2024-10-02 | End: 2024-11-01

## 2024-10-02 NOTE — TELEPHONE ENCOUNTER
From: Shawna Aggarwal  To: Benson Canales  Sent: 10/2/2024 9:43 AM CDT  Subject:  prescription     Good morning, I was trying to get my adderal prescription refilled this morning and was told that it  yesterday. Could you please send [facility] an updated prescription, thank you.  Shawna Aggarwal

## 2024-10-02 NOTE — TELEPHONE ENCOUNTER
Requesting   Requested Prescriptions     Pending Prescriptions Disp Refills    amphetamine-dextroamphetamine (ADDERALL) 30 MG Oral Tab 60 tablet 0     Sig: Take 1 tablet (30 mg total) by mouth 2 (two) times daily.       LOV: 7/3/24 Sudarshanepiel    Filled: 9/3/24 #60 with 0 refills    No future appointments.

## 2024-10-07 ENCOUNTER — APPOINTMENT (OUTPATIENT)
Dept: URBAN - METROPOLITAN AREA CLINIC 249 | Age: 54
Setting detail: DERMATOLOGY
End: 2024-10-07

## 2024-10-07 DIAGNOSIS — Z41.9 ENCOUNTER FOR PROCEDURE FOR PURPOSES OTHER THAN REMEDYING HEALTH STATE, UNSPECIFIED: ICD-10-CM

## 2024-10-07 PROCEDURE — OTHER JUVEDERM ULTRA PLUS XC INJECTION: OTHER

## 2024-10-07 PROCEDURE — OTHER BOTOX: OTHER

## 2024-10-07 NOTE — PROCEDURE: JUVEDERM ULTRA PLUS XC INJECTION
Anesthesia Type: 1% lidocaine with epinephrine
Lateral Face Filler Volume In Cc: 0
Post-Care Instructions: Patient instructed to apply ice to reduce swelling.
Include Cannula Information In Note?: No
Show Inventory Tab: Show
Number Of Syringes (Required For Inventory): 1
Anesthesia Volume In Cc: 0.5
Detail Level: Detailed
Filler: Juvederm Ultra Plus XC
Map Statment: See Attach Map for Complete Details
Consent: Written consent obtained. Risks include but not limited to bruising, beading, irregular texture, ulceration, infection, allergic reaction, scar formation, incomplete augmentation, temporary nature, procedural pain.
Procedural Text: The filler was administered to the treatment areas noted above.

## 2024-10-07 NOTE — PROCEDURE: BOTOX
Detail Level: Detailed
Dilution (U/0.1 Cc): 4
Map Statment: See attached map for complete details
Use Map Statement For Sites (Optional): No
Consent: Written consent was obtained prior to the procedure. Risks, benefits, expectations and alternatives were discussed including, but not limited to, infection, bleeding, lid/brow ptosis, bruising, swelling, diplopia, temporary effects, incomplete chemical denervation and dissatisfaction with the cosmetic outcome. No guarantee or warranty was given or implied regarding longevity of results.
Additional Area 2 Units: 0
Additional Area 1 Units: 50
Show Inventory Tab: Show
Bill Summary Price Listed Below, Or Bill Total Of Units X Price Per Unit?: Bill Summary Price Below
Postcare Instructions: Patient instructed to not lie down for 4 hours and limit physical activity for 24 hours. Patient instructed not to travel by airplane for 48 hours.
Additional Area 1 Location: forehead glabella crows
Reconstitution Date: 10/07/2024

## 2024-10-29 DIAGNOSIS — F98.8 ATTENTION DEFICIT DISORDER (ADD) WITHOUT HYPERACTIVITY: ICD-10-CM

## 2024-10-29 RX ORDER — DEXTROAMPHETAMINE SACCHARATE, AMPHETAMINE ASPARTATE, DEXTROAMPHETAMINE SULFATE AND AMPHETAMINE SULFATE 7.5; 7.5; 7.5; 7.5 MG/1; MG/1; MG/1; MG/1
30 TABLET ORAL 2 TIMES DAILY
Qty: 60 TABLET | Refills: 0 | Status: SHIPPED | OUTPATIENT
Start: 2024-10-29 | End: 2024-11-28

## 2024-10-29 NOTE — TELEPHONE ENCOUNTER
A refill request was received for:  Requested Prescriptions     Pending Prescriptions Disp Refills    amphetamine-dextroamphetamine (ADDERALL) 30 MG Oral Tab 60 tablet 0     Sig: Take 1 tablet (30 mg total) by mouth 2 (two) times daily.     Patient notified she will need to make appt with a new provider before the next refill in December.  Last refill date:10/2/2024       Last office visit:7/3/2024 [facility]    Follow up due:  No future appointments.

## 2024-11-11 DIAGNOSIS — I10 BENIGN HYPERTENSION: ICD-10-CM

## 2024-11-11 NOTE — TELEPHONE ENCOUNTER
A refill request was received for:  Requested Prescriptions     Pending Prescriptions Disp Refills    lisinopril 20 MG Oral Tab 90 tablet 0     Sig: Take 1 tablet (20 mg total) by mouth daily.       Last refill date: 07/03/24     Last office visit: 07/03/24      Future Appointments   Date Time Provider Department Center   11/19/2024  2:00 PM Anneliese Pal, NP [facility] [address]

## 2024-11-12 ENCOUNTER — TELEPHONE (OUTPATIENT)
Dept: FAMILY MEDICINE CLINIC | Facility: CLINIC | Age: 54
End: 2024-11-12

## 2024-11-12 RX ORDER — LISINOPRIL 20 MG/1
20 TABLET ORAL DAILY
Qty: 90 TABLET | Refills: 0 | Status: SHIPPED | OUTPATIENT
Start: 2024-11-12

## 2024-11-12 NOTE — TELEPHONE ENCOUNTER
Pt requesting bp med refill but has not had a physical in years. No labs since 2019.  Please schedule her a physical with any provider.

## 2024-11-13 ENCOUNTER — APPOINTMENT (OUTPATIENT)
Dept: URBAN - METROPOLITAN AREA CLINIC 249 | Age: 54
Setting detail: DERMATOLOGY
End: 2024-11-15

## 2024-11-13 DIAGNOSIS — L72.8 OTHER FOLLICULAR CYSTS OF THE SKIN AND SUBCUTANEOUS TISSUE: ICD-10-CM

## 2024-11-13 DIAGNOSIS — L70.0 ACNE VULGARIS: ICD-10-CM

## 2024-11-13 PROCEDURE — OTHER PRESCRIPTION: OTHER

## 2024-11-13 PROCEDURE — OTHER INTRALESIONAL KENALOG: OTHER

## 2024-11-13 PROCEDURE — 99213 OFFICE O/P EST LOW 20 MIN: CPT | Mod: 25

## 2024-11-13 PROCEDURE — 11900 INJECT SKIN LESIONS </W 7: CPT

## 2024-11-13 PROCEDURE — OTHER COUNSELING: OTHER

## 2024-11-13 RX ORDER — CLINDAMYCIN PHOSPHATE/BENZOYL PEROXIDE/ADAPALENE 1.5; 31; 12 MG/G; MG/G; MG/G
GEL TOPICAL
Qty: 50 | Refills: 5 | Status: ERX | COMMUNITY
Start: 2024-11-13

## 2024-11-13 RX ORDER — SPIRONOLACTONE 100 MG/1
TABLET, FILM COATED ORAL
Qty: 30 | Refills: 5 | Status: ERX | COMMUNITY
Start: 2024-11-13

## 2024-11-13 ASSESSMENT — LOCATION DETAILED DESCRIPTION DERM
LOCATION DETAILED: RIGHT INFERIOR CENTRAL BUCCAL CHEEK
LOCATION DETAILED: LEFT CHIN

## 2024-11-13 ASSESSMENT — LOCATION ZONE DERM: LOCATION ZONE: FACE

## 2024-11-13 ASSESSMENT — LOCATION SIMPLE DESCRIPTION DERM
LOCATION SIMPLE: CHIN
LOCATION SIMPLE: RIGHT CHEEK

## 2024-11-13 NOTE — PROCEDURE: COUNSELING
Detail Level: Detailed
Minocycline Pregnancy And Lactation Text: This medication is Pregnancy Category D and not consider safe during pregnancy. It is also excreted in breast milk.
Tazorac Pregnancy And Lactation Text: This medication is not safe during pregnancy. It is unknown if this medication is excreted in breast milk.
Azelaic Acid Counseling: Patient counseled that medicine may cause skin irritation and to avoid applying near the eyes.  In the event of skin irritation, the patient was advised to reduce the amount of the drug applied or use it less frequently.   The patient verbalized understanding of the proper use and possible adverse effects of azelaic acid.  All of the patient's questions and concerns were addressed.
Topical Clindamycin Pregnancy And Lactation Text: This medication is Pregnancy Category B and is considered safe during pregnancy. It is unknown if it is excreted in breast milk.
Doxycycline Counseling:  Patient counseled regarding possible photosensitivity and increased risk for sunburn.  Patient instructed to avoid sunlight, if possible.  When exposed to sunlight, patients should wear protective clothing, sunglasses, and sunscreen.  The patient was instructed to call the office immediately if the following severe adverse effects occur:  hearing changes, easy bruising/bleeding, severe headache, or vision changes.  The patient verbalized understanding of the proper use and possible adverse effects of doxycycline.  All of the patient's questions and concerns were addressed.
Benzoyl Peroxide Counseling: Patient counseled that medicine may cause skin irritation and bleach clothing.  In the event of skin irritation, the patient was advised to reduce the amount of the drug applied or use it less frequently.   The patient verbalized understanding of the proper use and possible adverse effects of benzoyl peroxide.  All of the patient's questions and concerns were addressed.
Detail Level: Zone
Erythromycin Counseling:  I discussed with the patient the risks of erythromycin including but not limited to GI upset, allergic reaction, drug rash, diarrhea, increase in liver enzymes, and yeast infections.
Topical Retinoid counseling:  Patient advised to apply a pea-sized amount only at bedtime and wait 30 minutes after washing their face before applying.  If too drying, patient may add a non-comedogenic moisturizer. The patient verbalized understanding of the proper use and possible adverse effects of retinoids.  All of the patient's questions and concerns were addressed.
Azithromycin Pregnancy And Lactation Text: This medication is considered safe during pregnancy and is also secreted in breast milk.
Spironolactone Pregnancy And Lactation Text: This medication can cause feminization of the male fetus and should be avoided during pregnancy. The active metabolite is also found in breast milk.
Topical Sulfur Applications Pregnancy And Lactation Text: This medication is Pregnancy Category C and has an unknown safety profile during pregnancy. It is unknown if this topical medication is excreted in breast milk.
Isotretinoin Counseling: Patient should get monthly blood tests, not donate blood, not drive at night if vision affected, not share medication, and not undergo elective surgery for 6 months after tx completed. Side effects reviewed, pt to contact office should one occur.
Winlevi Pregnancy And Lactation Text: This medication is considered safe during pregnancy and breastfeeding.
High Dose Vitamin A Counseling: Side effects reviewed, pt to contact office should one occur.
Use Enhanced Medication Counseling?: No
Bactrim Pregnancy And Lactation Text: This medication is Pregnancy Category D and is known to cause fetal risk.  It is also excreted in breast milk.
Tazorac Counseling:  Patient advised that medication is irritating and drying.  Patient may need to apply sparingly and wash off after an hour before eventually leaving it on overnight.  The patient verbalized understanding of the proper use and possible adverse effects of tazorac.  All of the patient's questions and concerns were addressed.
Aklief Pregnancy And Lactation Text: It is unknown if this medication is safe to use during pregnancy.  It is unknown if this medication is excreted in breast milk.  Breastfeeding women should use the topical cream on the smallest area of the skin for the shortest time needed while breastfeeding.  Do not apply to nipple and areola.
Birth Control Pills Pregnancy And Lactation Text: This medication should be avoided if pregnant and for the first 30 days post-partum.
Topical Clindamycin Counseling: Patient counseled that this medication may cause skin irritation or allergic reactions.  In the event of skin irritation, the patient was advised to reduce the amount of the drug applied or use it less frequently.   The patient verbalized understanding of the proper use and possible adverse effects of clindamycin.  All of the patient's questions and concerns were addressed.
Dapsone Pregnancy And Lactation Text: This medication is Pregnancy Category C and is not considered safe during pregnancy or breast feeding.
Minocycline Counseling: Patient advised regarding possible photosensitivity and discoloration of the teeth, skin, lips, tongue and gums.  Patient instructed to avoid sunlight, if possible.  When exposed to sunlight, patients should wear protective clothing, sunglasses, and sunscreen.  The patient was instructed to call the office immediately if the following severe adverse effects occur:  hearing changes, easy bruising/bleeding, severe headache, or vision changes.  The patient verbalized understanding of the proper use and possible adverse effects of minocycline.  All of the patient's questions and concerns were addressed.
Azelaic Acid Pregnancy And Lactation Text: This medication is considered safe during pregnancy and breast feeding.
Sarecycline Counseling: Patient advised regarding possible photosensitivity and discoloration of the teeth, skin, lips, tongue and gums.  Patient instructed to avoid sunlight, if possible.  When exposed to sunlight, patients should wear protective clothing, sunglasses, and sunscreen.  The patient was instructed to call the office immediately if the following severe adverse effects occur:  hearing changes, easy bruising/bleeding, severe headache, or vision changes.  The patient verbalized understanding of the proper use and possible adverse effects of sarecycline.  All of the patient's questions and concerns were addressed.
Doxycycline Pregnancy And Lactation Text: This medication is Pregnancy Category D and not consider safe during pregnancy. It is also excreted in breast milk but is considered safe for shorter treatment courses.
Topical Sulfur Applications Counseling: Topical Sulfur Counseling: Patient counseled that this medication may cause skin irritation or allergic reactions.  In the event of skin irritation, the patient was advised to reduce the amount of the drug applied or use it less frequently.   The patient verbalized understanding of the proper use and possible adverse effects of topical sulfur application.  All of the patient's questions and concerns were addressed.
Spironolactone Counseling: Patient advised regarding risks of diarrhea, abdominal pain, hyperkalemia, birth defects (for female patients), liver toxicity and renal toxicity. The patient may need blood work to monitor liver and kidney function and potassium levels while on therapy. The patient verbalized understanding of the proper use and possible adverse effects of spironolactone.  All of the patient's questions and concerns were addressed.
Benzoyl Peroxide Pregnancy And Lactation Text: This medication is Pregnancy Category C. It is unknown if benzoyl peroxide is excreted in breast milk.
Erythromycin Pregnancy And Lactation Text: This medication is Pregnancy Category B and is considered safe during pregnancy. It is also excreted in breast milk.
Azithromycin Counseling:  I discussed with the patient the risks of azithromycin including but not limited to GI upset, allergic reaction, drug rash, diarrhea, and yeast infections.
Winlevi Counseling:  I discussed with the patient the risks of topical clascoterone including but not limited to erythema, scaling, itching, and stinging. Patient voiced their understanding.
Tetracycline Counseling: Patient counseled regarding possible photosensitivity and increased risk for sunburn.  Patient instructed to avoid sunlight, if possible.  When exposed to sunlight, patients should wear protective clothing, sunglasses, and sunscreen.  The patient was instructed to call the office immediately if the following severe adverse effects occur:  hearing changes, easy bruising/bleeding, severe headache, or vision changes.  The patient verbalized understanding of the proper use and possible adverse effects of tetracycline.  All of the patient's questions and concerns were addressed. Patient understands to avoid pregnancy while on therapy due to potential birth defects.
Topical Retinoid Pregnancy And Lactation Text: This medication is Pregnancy Category C. It is unknown if this medication is excreted in breast milk.
Bactrim Counseling:  I discussed with the patient the risks of sulfa antibiotics including but not limited to GI upset, allergic reaction, drug rash, diarrhea, dizziness, photosensitivity, and yeast infections.  Rarely, more serious reactions can occur including but not limited to aplastic anemia, agranulocytosis, methemoglobinemia, blood dyscrasias, liver or kidney failure, lung infiltrates or desquamative/blistering drug rashes.
Isotretinoin Pregnancy And Lactation Text: This medication is Pregnancy Category X and is considered extremely dangerous during pregnancy. It is unknown if it is excreted in breast milk.
Birth Control Pills Counseling: Birth Control Pill Counseling: I discussed with the patient the potential side effects of OCPs including but not limited to increased risk of stroke, heart attack, thrombophlebitis, deep venous thrombosis, hepatic adenomas, breast changes, GI upset, headaches, and depression.  The patient verbalized understanding of the proper use and possible adverse effects of OCPs. All of the patient's questions and concerns were addressed.
Aklief counseling:  Patient advised to apply a pea-sized amount only at bedtime and wait 30 minutes after washing their face before applying.  If too drying, patient may add a non-comedogenic moisturizer.  The most commonly reported side effects including irritation, redness, scaling, dryness, stinging, burning, itching, and increased risk of sunburn.  The patient verbalized understanding of the proper use and possible adverse effects of retinoids.  All of the patient's questions and concerns were addressed.
Dapsone Counseling: I discussed with the patient the risks of dapsone including but not limited to hemolytic anemia, agranulocytosis, rashes, methemoglobinemia, kidney failure, peripheral neuropathy, headaches, GI upset, and liver toxicity.  Patients who start dapsone require monitoring including baseline LFTs and weekly CBCs for the first month, then every month thereafter.  The patient verbalized understanding of the proper use and possible adverse effects of dapsone.  All of the patient's questions and concerns were addressed.
High Dose Vitamin A Pregnancy And Lactation Text: High dose vitamin A therapy is contraindicated during pregnancy and breast feeding.

## 2024-11-13 NOTE — PROCEDURE: INTRALESIONAL KENALOG
Kenalog Preparation: Kenalog
Bill For Wasted Drug (Kenalog)?: no
Ndc# For Kenalog Only: 2395-0918-43
How Many Mls Were Removed From The 40 Mg/Ml (10ml) Vial When Preparing The Injectable Solution?: 0
Concentration Of Kenalog Solution Injected (Mg/Ml): 3.0
Total Volume (Ccs): 0.3
Detail Level: Detailed
Validate Note Data When Using Inventory: Yes
Administered By (Optional): Madhavi
Lot # For Kenalog (Optional): [id number]
Kenalog Type Of Vial: Multiple Dose
Medical Necessity Clause: This procedure was medically necessary because the lesions that were treated were:
Expiration Date For Kenalog (Optional): Jan2026
Show Inventory Tab: Hide
Consent: The risks of atrophy were reviewed with the patient.

## 2024-11-13 NOTE — HPI: EVALUATION OF SKIN LESION(S)
What Type Of Note Output Would You Prefer (Optional)?: Bullet Format
Have Your Spot(S) Been Treated In The Past?: has not been treated
Hpi Title: Evaluation of a Skin Lesion
Additional History: Has had [place] Eric inj administered in cysts in the past.

## 2024-11-19 ENCOUNTER — OFFICE VISIT (OUTPATIENT)
Dept: FAMILY MEDICINE CLINIC | Facility: CLINIC | Age: 54
End: 2024-11-19
Payer: COMMERCIAL

## 2024-11-19 VITALS
RESPIRATION RATE: 18 BRPM | BODY MASS INDEX: 25.48 KG/M2 | OXYGEN SATURATION: 97 % | SYSTOLIC BLOOD PRESSURE: 138 MMHG | WEIGHT: 172 LBS | HEART RATE: 133 BPM | HEIGHT: 69 IN | DIASTOLIC BLOOD PRESSURE: 82 MMHG

## 2024-11-19 DIAGNOSIS — F90.0 ATTENTION DEFICIT HYPERACTIVITY DISORDER (ADHD), PREDOMINANTLY INATTENTIVE TYPE: ICD-10-CM

## 2024-11-19 DIAGNOSIS — I10 PRIMARY HYPERTENSION: Primary | ICD-10-CM

## 2024-11-19 PROCEDURE — 99213 OFFICE O/P EST LOW 20 MIN: CPT | Performed by: INTERNAL MEDICINE

## 2024-11-19 NOTE — PROGRESS NOTES
Shawna Aggarwal is a 54 year old female.  HPI:   Previous patient of Dr. Calhoun's.    Follow up HTN; doing well on lisinopril. No SEs. No added Na+/    Follow up Adderall; adult onset. Adderall 30mg. No hyperactivity. No change in diet, sleep or mood.    Follow up mood, depression. Takes amitriptyline nightly. Feels stable on this med/dose.    Will schedule physical in the near future.  Current Outpatient Medications   Medication Sig Dispense Refill    lisinopril 20 MG Oral Tab Take 1 tablet (20 mg total) by mouth daily. 90 tablet 0    amphetamine-dextroamphetamine (ADDERALL) 30 MG Oral Tab Take 1 tablet (30 mg total) by mouth 2 (two) times daily. 60 tablet 0    amitriptyline 50 MG Oral Tab Take 1 tablet (50 mg total) by mouth nightly. 90 tablet 0    dorzolamide-timolol 2-0.5 % Ophthalmic Solution Place 1 drop into both eyes 2 (two) times daily.      latanoprost 0.005 % Ophthalmic Solution Place 1 drop into both eyes nightly. TAKE AT BEDTIME      Multiple Vitamins-Minerals (MULTI-DAY PLUS MINERALS) Oral Tab Take 1 tablet by mouth daily.          Past Medical History:    Attention deficit disorder    Depression    Elevated sedimentation rate    Microcytic anemia    Night sweats    Obesity, unspecified    Peritoneal abscess (HCC)    Primary ovarian failure    Visual impairment    glasses      Social History:  Social History     Socioeconomic History    Marital status: Single   Tobacco Use    Smoking status: Never    Smokeless tobacco: Never   Vaping Use    Vaping status: Never Used   Substance and Sexual Activity    Alcohol use: Yes     Alcohol/week: 4.2 standard drinks of alcohol     Types: 5 Standard drinks or equivalent per week    Drug use: No    Sexual activity: Not Currently     Partners: Male     Birth control/protection: Tubal Ligation, Hysterectomy   Other Topics Concern    Caffeine Concern Yes     Comment: 1x a day    Exercise Yes     Comment: 2x a week    Seat Belt Yes        REVIEW OF SYSTEMS:   GENERAL  HEALTH: feels well otherwise  SKIN: denies any unusual skin lesions or rashes  RESPIRATORY: denies shortness of breath or cough  CARDIOVASCULAR: denies chest pain or pressure  GI: denies N/V/D; denies abdominal pain    : denies dysuria, frequency or incontinence  NEURO: denies headaches, denies dizziness; denies numbness or tingling    EXAM:   /82   Pulse (!) 133   Resp 18   Ht 5' 9\" (1.753 m)   Wt 172 lb (78 kg)   SpO2 97%   BMI 25.40 kg/m²   GENERAL: well developed, well nourished,in no apparent distress  SKIN: warm & dry  HEENT: atraumatic, normocephalic, TMs clear, throat clear  NECK: supple,no adenopathy   LUNGS: CTA, easy breathing  CV: normal S1S2, RRR without murmur  GI: good BS's,no masses, HSM or tenderness  EXT: no cyanosis, clubbing or edema    ASSESSMENT AND PLAN:    1. Primary hypertension  - CPM  - lisinopril refills prn    2. Attention deficit hyperactivity disorder (ADHD), predominantly inattentive type  - stable, CPM  - will send refills in the near future, too soon today    3. Depression, stablle  -continue amitriptyine      The patient indicates understanding of these issues and agrees to the plan.  Follow up for CPE.

## 2024-12-03 ENCOUNTER — PATIENT MESSAGE (OUTPATIENT)
Dept: FAMILY MEDICINE CLINIC | Facility: CLINIC | Age: 54
End: 2024-12-03

## 2024-12-03 DIAGNOSIS — F98.8 ATTENTION DEFICIT DISORDER (ADD) WITHOUT HYPERACTIVITY: ICD-10-CM

## 2024-12-03 RX ORDER — DEXTROAMPHETAMINE SACCHARATE, AMPHETAMINE ASPARTATE, DEXTROAMPHETAMINE SULFATE AND AMPHETAMINE SULFATE 7.5; 7.5; 7.5; 7.5 MG/1; MG/1; MG/1; MG/1
30 TABLET ORAL 2 TIMES DAILY
Qty: 60 TABLET | Refills: 0 | Status: CANCELLED
Start: 2024-12-03 | End: 2025-01-02

## 2024-12-03 RX ORDER — DEXTROAMPHETAMINE SACCHARATE, AMPHETAMINE ASPARTATE, DEXTROAMPHETAMINE SULFATE AND AMPHETAMINE SULFATE 7.5; 7.5; 7.5; 7.5 MG/1; MG/1; MG/1; MG/1
30 TABLET ORAL 2 TIMES DAILY
Qty: 60 TABLET | Refills: 0 | Status: SHIPPED | OUTPATIENT
Start: 2025-01-03 | End: 2025-02-02

## 2024-12-03 RX ORDER — DEXTROAMPHETAMINE SACCHARATE, AMPHETAMINE ASPARTATE, DEXTROAMPHETAMINE SULFATE AND AMPHETAMINE SULFATE 7.5; 7.5; 7.5; 7.5 MG/1; MG/1; MG/1; MG/1
30 TABLET ORAL 2 TIMES DAILY
Qty: 60 TABLET | Refills: 0 | Status: SHIPPED | OUTPATIENT
Start: 2025-02-03 | End: 2025-03-05

## 2024-12-03 RX ORDER — DEXTROAMPHETAMINE SACCHARATE, AMPHETAMINE ASPARTATE, DEXTROAMPHETAMINE SULFATE AND AMPHETAMINE SULFATE 7.5; 7.5; 7.5; 7.5 MG/1; MG/1; MG/1; MG/1
30 TABLET ORAL 2 TIMES DAILY
Qty: 60 TABLET | Refills: 0 | Status: SHIPPED | OUTPATIENT
Start: 2024-12-03 | End: 2025-01-02

## 2024-12-16 ENCOUNTER — PATIENT MESSAGE (OUTPATIENT)
Dept: FAMILY MEDICINE CLINIC | Facility: CLINIC | Age: 54
End: 2024-12-16

## 2024-12-16 DIAGNOSIS — F34.1 DYSTHYMIA: ICD-10-CM

## 2024-12-16 RX ORDER — AMITRIPTYLINE HYDROCHLORIDE 50 MG/1
50 TABLET ORAL NIGHTLY
Qty: 90 TABLET | Refills: 0 | Status: SHIPPED | OUTPATIENT
Start: 2024-12-16

## 2025-01-21 ENCOUNTER — OFFICE VISIT (OUTPATIENT)
Dept: FAMILY MEDICINE CLINIC | Facility: CLINIC | Age: 55
End: 2025-01-21
Payer: COMMERCIAL

## 2025-01-21 DIAGNOSIS — Z12.31 ENCOUNTER FOR SCREENING MAMMOGRAM FOR BREAST CANCER: ICD-10-CM

## 2025-01-21 DIAGNOSIS — Z00.00 ROUTINE GENERAL MEDICAL EXAMINATION AT A HEALTH CARE FACILITY: Primary | ICD-10-CM

## 2025-01-21 PROCEDURE — 99396 PREV VISIT EST AGE 40-64: CPT | Performed by: INTERNAL MEDICINE

## 2025-01-21 NOTE — PROGRESS NOTES
HPI:   The 21st Century Cures Act makes medical notes like these available to patients in the interest of transparency. Please be advised this is a medical document. Medical documents are intended to carry relevant information, facts as evident, and the clinical opinion of the practitioner. The medical note is intended as peer to peer communication and may appear blunt or direct. It is written in medical language and may contain abbreviations or verbiage that are unfamiliar.     Shawna Aggarwal is a 54 year old female who presents for an annual physical.    No LMP recorded. (Menstrual status: Partial Hysterectomy).  Previous pap: 2018  GYNE: Dr. Harden  Robotic assisted lap SHERYL, 2019. Post exam- absent cervix, pathologist cervix- negative  Performs SBEs:yes  Colonoscopy: none on file                   MVI and hair vitamin- Nutrifol.  + menopausal  Trying to eat well and stay active    Current Outpatient Medications   Medication Sig Dispense Refill    amitriptyline 50 MG Oral Tab Take 1 tablet (50 mg total) by mouth nightly. 90 tablet 0    amphetamine-dextroamphetamine (ADDERALL) 30 MG Oral Tab Take 1 tablet (30 mg total) by mouth 2 (two) times daily. 60 tablet 0    [START ON 2/3/2025] amphetamine-dextroamphetamine (ADDERALL) 30 MG Oral Tab Take 1 tablet (30 mg total) by mouth 2 (two) times daily. 60 tablet 0    lisinopril 20 MG Oral Tab Take 1 tablet (20 mg total) by mouth daily. 90 tablet 0    dorzolamide-timolol 2-0.5 % Ophthalmic Solution Place 1 drop into both eyes 2 (two) times daily.      latanoprost 0.005 % Ophthalmic Solution Place 1 drop into both eyes nightly. TAKE AT BEDTIME      Multiple Vitamins-Minerals (MULTI-DAY PLUS MINERALS) Oral Tab Take 1 tablet by mouth daily.          Past Medical History:    Attention deficit disorder    Depression    Elevated sedimentation rate    Microcytic anemia    Night sweats    Obesity, unspecified    Peritoneal abscess (HCC)    Primary ovarian failure    Visual  impairment    glasses      Past Surgical History:   Procedure Laterality Date    Abdominoplasty  2009          Enlarge breast      2011    Oral surgery procedure      tooth extraction    Tubal ligation  2003      Family History   Problem Relation Age of Onset    Diabetes Mother     Diabetes Father     ADHD Sister     ADHD Brother     Alcohol and Other Disorders Associated Son     ADHD Son     ADHD Other       Social History:   Social History     Socioeconomic History    Marital status: Single   Tobacco Use    Smoking status: Never    Smokeless tobacco: Never   Vaping Use    Vaping status: Never Used   Substance and Sexual Activity    Alcohol use: Yes     Alcohol/week: 4.2 standard drinks of alcohol     Types: 5 Standard drinks or equivalent per week    Drug use: No    Sexual activity: Not Currently     Partners: Male     Birth control/protection: Tubal Ligation, Hysterectomy   Other Topics Concern    Caffeine Concern Yes     Comment: 1x a day    Exercise Yes     Comment: 2x a week    Seat Belt Yes     Exercise:  routine exercise .  Diet:  balanced     REVIEW OF SYSTEMS:   GENERAL: feels well otherwise  SKIN: denies unusual skin lesions  HEENT:no vision or hearing changes; denies nasal congestion, sinus pain or sore throat  LUNGS: denies shortness of breath, chest heaviness or cough  CV: denies chest pain, pressure or palpitations  GI: denies abdominal pain; denies heartburn, frequent diarrhea or constipation  : denies dysuria, vaginal discharge or itching; denies vaginal dryness or dyspareunia   MS: denies back pain  NEURO: denies headaches  no dizziness  PSYCH: + ADD  HEME: denies hx of anemia  ENDOCRINE: denies thyroid history, denies excessive thirst, + menopausal   ALL/ASTHMA: denies hx of  allergy or asthma    EXAM:   /90   Pulse 78   Resp 18   Ht 5' 9\" (1.753 m)   Wt 172 lb (78 kg)   SpO2 99%   BMI 25.40 kg/m²       Body mass index is 25.4 kg/m².      GENERAL: pleasant female in no  apparent distress  SKIN: warm and dry  HEENT: atraumatic, normocephalic; PERRLA, conjunctiva clear, ears and throat are clear  NECK: supple,no adenopathy,no thyromegaly  LUNGS: clear to auscultation, easy breathing  CV: normal S1S2, RRR without murmur  GI: BSs present, no tenderness or organomegaly  :no distention, no TTP  MS: Jeri, no bony deformities  EXT: no cyanosis, clubbing or edema  NEURO: A&Ox3, motor and sensory are grossly intact; good coordination, no tremor    ASSESSMENT AND PLAN:   1. Routine general medical examination at a health care facility  Reinforced routine SBEs. Discussed the benefits of routine exercise, a heart healthy diet and annual flu vaccines.  - CBC With Differential With Platelet  - Comp Metabolic Panel (14)  - Lipid Panel  - TSH W Reflex To Free T4  - Vitamin B12  - Vitamin D, 25-Hydroxy    2. Encounter for screening mammogram for breast cancer  - [facility] 2D+3D SCREENING [facility]([id number]); Future    3. Screening for malignant neoplasm of colon  - I've sorted thru her past records for prior c-scope, checked CE, no reports  - inform pt and order C-scope, pt lives in [place] and may have it done closer to home    Follow up WWE in 1 year.  Shawna was given an opportunity to ask questions and verbalized understanding of care.          .

## 2025-02-02 VITALS
WEIGHT: 172 LBS | SYSTOLIC BLOOD PRESSURE: 134 MMHG | DIASTOLIC BLOOD PRESSURE: 80 MMHG | HEIGHT: 69 IN | BODY MASS INDEX: 25.48 KG/M2 | OXYGEN SATURATION: 99 % | HEART RATE: 78 BPM | RESPIRATION RATE: 18 BRPM

## 2025-02-16 DIAGNOSIS — I10 BENIGN HYPERTENSION: ICD-10-CM

## 2025-02-17 RX ORDER — LISINOPRIL 20 MG/1
20 TABLET ORAL DAILY
Qty: 90 TABLET | Refills: 0 | Status: SHIPPED | OUTPATIENT
Start: 2025-02-17

## 2025-03-02 DIAGNOSIS — F98.8 ATTENTION DEFICIT DISORDER (ADD) WITHOUT HYPERACTIVITY: ICD-10-CM

## 2025-03-02 RX ORDER — DEXTROAMPHETAMINE SACCHARATE, AMPHETAMINE ASPARTATE, DEXTROAMPHETAMINE SULFATE AND AMPHETAMINE SULFATE 7.5; 7.5; 7.5; 7.5 MG/1; MG/1; MG/1; MG/1
30 TABLET ORAL 2 TIMES DAILY
Qty: 60 TABLET | Refills: 0 | Status: CANCELLED | OUTPATIENT
Start: 2025-03-02 | End: 2025-04-01

## 2025-03-03 RX ORDER — DEXTROAMPHETAMINE SACCHARATE, AMPHETAMINE ASPARTATE, DEXTROAMPHETAMINE SULFATE AND AMPHETAMINE SULFATE 7.5; 7.5; 7.5; 7.5 MG/1; MG/1; MG/1; MG/1
30 TABLET ORAL 2 TIMES DAILY
Qty: 60 TABLET | Refills: 0 | Status: SHIPPED | OUTPATIENT
Start: 2025-04-03 | End: 2025-05-03

## 2025-03-03 RX ORDER — DEXTROAMPHETAMINE SACCHARATE, AMPHETAMINE ASPARTATE, DEXTROAMPHETAMINE SULFATE AND AMPHETAMINE SULFATE 7.5; 7.5; 7.5; 7.5 MG/1; MG/1; MG/1; MG/1
30 TABLET ORAL 2 TIMES DAILY
Qty: 60 TABLET | Refills: 0 | Status: SHIPPED | OUTPATIENT
Start: 2025-05-04 | End: 2025-06-03

## 2025-03-03 RX ORDER — DEXTROAMPHETAMINE SACCHARATE, AMPHETAMINE ASPARTATE, DEXTROAMPHETAMINE SULFATE AND AMPHETAMINE SULFATE 7.5; 7.5; 7.5; 7.5 MG/1; MG/1; MG/1; MG/1
30 TABLET ORAL 2 TIMES DAILY
Qty: 60 TABLET | Refills: 0 | Status: SHIPPED | OUTPATIENT
Start: 2025-03-03 | End: 2025-04-02

## 2025-03-03 NOTE — TELEPHONE ENCOUNTER
A refill request was received for:  Requested Prescriptions     Pending Prescriptions Disp Refills    amphetamine-dextroamphetamine (ADDERALL) 30 MG Oral Tab 60 tablet 0     Sig: Take 1 tablet (30 mg total) by mouth 2 (two) times daily.       Last refill date:   2-3-25    Last office visit: 1-21-25    Follow up due:  Future Appointments   Date Time Provider Department Center   3/19/2025  1:20 PM [facility] [place]

## 2025-03-19 DIAGNOSIS — F34.1 DYSTHYMIA: ICD-10-CM

## 2025-03-19 RX ORDER — AMITRIPTYLINE HYDROCHLORIDE 50 MG/1
50 TABLET ORAL NIGHTLY
Qty: 90 TABLET | Refills: 3 | Status: SHIPPED | OUTPATIENT
Start: 2025-03-19

## 2025-03-19 NOTE — TELEPHONE ENCOUNTER
A refill request was received for:  Requested Prescriptions     Pending Prescriptions Disp Refills    AMITRIPTYLINE 50 MG Oral Tab [Pharmacy Med Name: AMITRIPTYLINE 50MG TABLETS] 90 tablet 0     Sig: TAKE 1 TABLET(50 MG) BY MOUTH EVERY NIGHT       Last refill date: 12/16/2024      Last office visit:1/21/2025     Follow up due:  Future Appointments   Date Time Provider Department Center   3/26/2025  1:20 PM [facility] [place]

## 2025-04-08 ENCOUNTER — APPOINTMENT (OUTPATIENT)
Dept: URBAN - METROPOLITAN AREA CLINIC 247 | Age: 55
Setting detail: DERMATOLOGY
End: 2025-04-08

## 2025-04-08 DIAGNOSIS — Z41.9 ENCOUNTER FOR PROCEDURE FOR PURPOSES OTHER THAN REMEDYING HEALTH STATE, UNSPECIFIED: ICD-10-CM

## 2025-04-08 PROCEDURE — OTHER JUVEDERM VOLBELLA INJECTION: OTHER

## 2025-04-08 PROCEDURE — OTHER JUVEDERM ULTRA PLUS XC INJECTION: OTHER

## 2025-04-08 PROCEDURE — OTHER BOTOX: OTHER

## 2025-04-08 NOTE — PROCEDURE: BOTOX
Additional Area 5 Units: 0
Show Inventory Tab: Show
Use Map Statement For Sites (Optional): No
Dilution (U/0.1 Cc): 4
Consent: Written consent was obtained prior to the procedure. Risks, benefits, expectations and alternatives were discussed including, but not limited to, infection, bleeding, lid/brow ptosis, bruising, swelling, diplopia, temporary effects, incomplete chemical denervation and dissatisfaction with the cosmetic outcome. No guarantee or warranty was given or implied regarding longevity of results.
Map Statment: See attached map for complete details
Bill Summary Price Listed Below, Or Bill Total Of Units X Price Per Unit?: Bill Summary Price Below
Reconstitution Date: 04/08/2025
Detail Level: Detailed
Postcare Instructions: Patient instructed to not lie down for 4 hours and limit physical activity for 24 hours. Patient instructed not to travel by airplane for 48 hours.

## 2025-04-08 NOTE — PROCEDURE: JUVEDERM VOLBELLA INJECTION
Cheeks Filler Volume In Cc: 0
Anesthesia Volume In Cc: 0.5
Consent: Written consent obtained. Risks include but not limited to bruising, beading, irregular texture, ulceration, infection, allergic reaction, scar formation, incomplete augmentation, temporary nature, procedural pain.
Map Statment: See Attach Map for Complete Details
Procedural Text: The filler was administered to the treatment areas noted above.
Include Cannula Information In Note?: No
Detail Level: Detailed
Filler: Juvederm Volbella XC
Post-Care Instructions: Patient instructed to apply ice to reduce swelling.
Additional Anesthesia Volume In Cc: 6
Show Inventory Tab: Show
Number Of Syringes (Required For Inventory): 1
Anesthesia Type: 1% lidocaine with epinephrine

## 2025-04-08 NOTE — PROCEDURE: JUVEDERM ULTRA PLUS XC INJECTION
Anesthesia Type: 1% lidocaine with epinephrine
Nasolabial Folds Filler Volume In Cc: 0
Use Map Statement For Sites (Optional): No
Procedural Text: The filler was administered to the treatment areas noted above.
Post-Care Instructions: Patient instructed to apply ice to reduce swelling.
Additional Anesthesia Volume In Cc: 6
Consent: Written consent obtained. Risks include but not limited to bruising, beading, irregular texture, ulceration, infection, allergic reaction, scar formation, incomplete augmentation, temporary nature, procedural pain.
Show Inventory Tab: Show
Anesthesia Volume In Cc: 0.5
Map Statment: See Attach Map for Complete Details
Number Of Syringes (Required For Inventory): 1
Detail Level: Detailed
Filler: Juvederm Ultra Plus XC

## 2025-05-08 ENCOUNTER — LAB ENCOUNTER (OUTPATIENT)
Dept: LAB | Age: 55
End: 2025-05-08
Attending: INTERNAL MEDICINE
Payer: COMMERCIAL

## 2025-05-08 LAB
ALBUMIN SERPL-MCNC: 5.3 G/DL (ref 3.2–4.8)
ALBUMIN/GLOB SERPL: 1.8 {RATIO} (ref 1–2)
ALP LIVER SERPL-CCNC: 53 U/L (ref 41–108)
ALT SERPL-CCNC: <7 U/L (ref 10–49)
ANION GAP SERPL CALC-SCNC: 7 MMOL/L (ref 0–18)
AST SERPL-CCNC: 28 U/L (ref ?–34)
BASOPHILS # BLD AUTO: 0.04 X10(3) UL (ref 0–0.2)
BASOPHILS NFR BLD AUTO: 0.8 %
BILIRUB SERPL-MCNC: 0.6 MG/DL (ref 0.3–1.2)
BUN BLD-MCNC: 13 MG/DL (ref 9–23)
CALCIUM BLD-MCNC: 10.2 MG/DL (ref 8.7–10.6)
CHLORIDE SERPL-SCNC: 101 MMOL/L (ref 98–112)
CHOLEST SERPL-MCNC: 253 MG/DL (ref ?–200)
CO2 SERPL-SCNC: 31 MMOL/L (ref 21–32)
CREAT BLD-MCNC: 0.67 MG/DL (ref 0.55–1.02)
EGFRCR SERPLBLD CKD-EPI 2021: 103 ML/MIN/1.73M2 (ref 60–?)
EOSINOPHIL # BLD AUTO: 0.14 X10(3) UL (ref 0–0.7)
EOSINOPHIL NFR BLD AUTO: 3 %
ERYTHROCYTE [DISTWIDTH] IN BLOOD BY AUTOMATED COUNT: 11.7 %
FASTING PATIENT LIPID ANSWER: YES
FASTING STATUS PATIENT QL REPORTED: YES
GLOBULIN PLAS-MCNC: 2.9 G/DL (ref 2–3.5)
GLUCOSE BLD-MCNC: 101 MG/DL (ref 70–99)
HCT VFR BLD AUTO: 42.3 % (ref 35–48)
HDLC SERPL-MCNC: 99 MG/DL (ref 40–59)
HGB BLD-MCNC: 14.1 G/DL (ref 12–16)
IMM GRANULOCYTES # BLD AUTO: 0.02 X10(3) UL (ref 0–1)
IMM GRANULOCYTES NFR BLD: 0.4 %
LDLC SERPL CALC-MCNC: 145 MG/DL (ref ?–100)
LYMPHOCYTES # BLD AUTO: 1.71 X10(3) UL (ref 1–4)
LYMPHOCYTES NFR BLD AUTO: 36.1 %
MCH RBC QN AUTO: 33.3 PG (ref 26–34)
MCHC RBC AUTO-ENTMCNC: 33.3 G/DL (ref 31–37)
MCV RBC AUTO: 100 FL (ref 80–100)
MONOCYTES # BLD AUTO: 0.24 X10(3) UL (ref 0.1–1)
MONOCYTES NFR BLD AUTO: 5.1 %
NEUTROPHILS # BLD AUTO: 2.59 X10 (3) UL (ref 1.5–7.7)
NEUTROPHILS # BLD AUTO: 2.59 X10(3) UL (ref 1.5–7.7)
NEUTROPHILS NFR BLD AUTO: 54.6 %
NONHDLC SERPL-MCNC: 154 MG/DL (ref ?–130)
OSMOLALITY SERPL CALC.SUM OF ELEC: 288 MOSM/KG (ref 275–295)
PLATELET # BLD AUTO: 304 10(3)UL (ref 150–450)
POTASSIUM SERPL-SCNC: 4 MMOL/L (ref 3.5–5.1)
PROT SERPL-MCNC: 8.2 G/DL (ref 5.7–8.2)
RBC # BLD AUTO: 4.23 X10(6)UL (ref 3.8–5.3)
SODIUM SERPL-SCNC: 139 MMOL/L (ref 136–145)
TRIGL SERPL-MCNC: 57 MG/DL (ref 30–149)
TSI SER-ACNC: 1.09 UIU/ML (ref 0.55–4.78)
VIT B12 SERPL-MCNC: 475 PG/ML (ref 211–911)
VIT D+METAB SERPL-MCNC: 57.6 NG/ML (ref 30–100)
VLDLC SERPL CALC-MCNC: 11 MG/DL (ref 0–30)
WBC # BLD AUTO: 4.7 X10(3) UL (ref 4–11)

## 2025-05-08 PROCEDURE — 80061 LIPID PANEL: CPT | Performed by: INTERNAL MEDICINE

## 2025-05-08 PROCEDURE — 36415 COLL VENOUS BLD VENIPUNCTURE: CPT | Performed by: INTERNAL MEDICINE

## 2025-05-08 PROCEDURE — 80053 COMPREHEN METABOLIC PANEL: CPT | Performed by: INTERNAL MEDICINE

## 2025-05-08 PROCEDURE — 82306 VITAMIN D 25 HYDROXY: CPT | Performed by: INTERNAL MEDICINE

## 2025-05-08 PROCEDURE — 85025 COMPLETE CBC W/AUTO DIFF WBC: CPT | Performed by: INTERNAL MEDICINE

## 2025-05-08 PROCEDURE — 84443 ASSAY THYROID STIM HORMONE: CPT | Performed by: INTERNAL MEDICINE

## 2025-05-08 PROCEDURE — 82607 VITAMIN B-12: CPT | Performed by: INTERNAL MEDICINE

## 2025-05-19 ENCOUNTER — PATIENT MESSAGE (OUTPATIENT)
Dept: FAMILY MEDICINE CLINIC | Facility: CLINIC | Age: 55
End: 2025-05-19

## 2025-05-19 DIAGNOSIS — F34.1 DYSTHYMIA: ICD-10-CM

## 2025-05-19 DIAGNOSIS — F90.2 ATTENTION DEFICIT HYPERACTIVITY DISORDER (ADHD), COMBINED TYPE: ICD-10-CM

## 2025-05-19 RX ORDER — AMITRIPTYLINE HYDROCHLORIDE 50 MG/1
50 TABLET ORAL NIGHTLY
Qty: 90 TABLET | Refills: 3 | OUTPATIENT
Start: 2025-05-19

## 2025-05-20 RX ORDER — DEXTROAMPHETAMINE SACCHARATE, AMPHETAMINE ASPARTATE, DEXTROAMPHETAMINE SULFATE AND AMPHETAMINE SULFATE 7.5; 7.5; 7.5; 7.5 MG/1; MG/1; MG/1; MG/1
30 TABLET ORAL 2 TIMES DAILY
Qty: 60 TABLET | Refills: 0 | Status: SHIPPED | OUTPATIENT
Start: 2025-06-03 | End: 2025-07-03

## 2025-05-20 RX ORDER — DEXTROAMPHETAMINE SACCHARATE, AMPHETAMINE ASPARTATE, DEXTROAMPHETAMINE SULFATE AND AMPHETAMINE SULFATE 7.5; 7.5; 7.5; 7.5 MG/1; MG/1; MG/1; MG/1
30 TABLET ORAL 2 TIMES DAILY
Qty: 60 TABLET | Refills: 0 | Status: SHIPPED | OUTPATIENT
Start: 2025-07-03 | End: 2025-08-02

## 2025-05-20 NOTE — TELEPHONE ENCOUNTER
Pt last got Adderall on 5/3/25  Fill dates for June and July are   Earliest Fill Date:   6/7/2025   Earliest Fill Date:   7/8/2025     Okay to change to 6/3 and 7/3?

## 2025-07-25 ENCOUNTER — PATIENT MESSAGE (OUTPATIENT)
Dept: FAMILY MEDICINE CLINIC | Facility: CLINIC | Age: 55
End: 2025-07-25

## 2025-07-25 DIAGNOSIS — F90.2 ATTENTION DEFICIT HYPERACTIVITY DISORDER (ADHD), COMBINED TYPE: ICD-10-CM

## 2025-07-30 RX ORDER — DEXTROAMPHETAMINE SACCHARATE, AMPHETAMINE ASPARTATE, DEXTROAMPHETAMINE SULFATE AND AMPHETAMINE SULFATE 7.5; 7.5; 7.5; 7.5 MG/1; MG/1; MG/1; MG/1
30 TABLET ORAL 2 TIMES DAILY
Qty: 12 TABLET | Refills: 0 | Status: SHIPPED | OUTPATIENT
Start: 2025-07-30 | End: 2025-08-05

## (undated) DEVICE — KENDALL SCD EXPRESS SLEEVES, KNEE LENGTH, MEDIUM: Brand: KENDALL SCD

## (undated) DEVICE — INSUFFLATION NEEDLE TO ESTABLISH PNEUMOPERITONEUM.: Brand: INSUFFLATION NEEDLE

## (undated) DEVICE — SUTURE VLOC 180 0 12\" 0316

## (undated) DEVICE — MEGA NEEDLE DRIVER: Brand: ENDOWRIST

## (undated) DEVICE — VISUALIZATION SYSTEM: Brand: CLEARIFY

## (undated) DEVICE — DERMABOND LIQUID ADHESIVE

## (undated) DEVICE — AIRSEAL 8 MM ACCESS PORT AND LOW PROFILE OBTURATOR WITH BLADELESS OPTICAL TIP, 120 MM LENGTH: Brand: AIRSEAL

## (undated) DEVICE — ARM DRAPE

## (undated) DEVICE — COLUMN DRAPE

## (undated) DEVICE — VIOLET BRAIDED (POLYGLACTIN 910), SYNTHETIC ABSORBABLE SUTURE: Brand: COATED VICRYL

## (undated) DEVICE — BLADELESS OBTURATOR: Brand: WECK VISTA

## (undated) DEVICE — MONOPOLAR CURVED SCISSORS: Brand: ENDOWRIST

## (undated) DEVICE — VCARE MEDIUM, UTERINE MANIPULATOR, VAGINAL-CERVICAL-AHLUWALIA'S-RETRACTOR-ELEVATOR: Brand: VCARE

## (undated) DEVICE — PROGRASP FORCEPS: Brand: ENDOWRIST

## (undated) DEVICE — 40580 - THE PINK PAD - ADVANCED TRENDELENBURG POSITIONING KIT: Brand: 40580 - THE PINK PAD - ADVANCED TRENDELENBURG POSITIONING KIT

## (undated) DEVICE — GAMMEX® NON-LATEX PI TEXTURED SIZE 6, STERILE POLYISOPRENE POWDER-FREE SURGICAL GLOVE: Brand: GAMMEX

## (undated) DEVICE — TRI-LUMEN FILTERED TUBE SET WITH ACTIVATED CHARCOAL FILTER: Brand: AIRSEAL

## (undated) DEVICE — GLOVE SURG TRIUMPH SZ 6-1/2

## (undated) DEVICE — BIPOLAR GRASPER, LONG: Brand: ENDOWRIST

## (undated) DEVICE — CANNULA SEAL

## (undated) DEVICE — ELECTRO LUBE IS A SINGLE PATIENT USE DEVICE THAT IS INTENDED TO BE USED ON ELECTROSURGICAL ELECTRODES TO REDUCE STICKING.: Brand: KEY SURGICAL ELECTRO LUBE

## (undated) DEVICE — GYN LAP/ROBOTIC: Brand: MEDLINE INDUSTRIES, INC.

## (undated) DEVICE — TIP COVER ACCESSORY

## (undated) DEVICE — SUTURE MONOCRYL 4-0 PS-2

## (undated) NOTE — MR AVS SNAPSHOT
[address]  [address] [facility], [address] [address] [phone]               Thank you for choosing us for your health care visit with Ryanne Hdz MD.  We are glad to serve you and happy to provide you with this You can get these medications from any pharmacy     Bring a paper prescription for each of these medications    - amphetamine-dextroamphetamine 20 MG Tabs  - amphetamine-dextroamphetamine 20 MG Tabs  - amphetamine-dextroamphetamine 20 MG Tabs            My Get your heart pumping – brisk walking, biking, swimming Even 10 minute increments are effective and add up over the week   2 ½ hours per week – spread out over time Use a mona to keep you motivated   Don’t forget strength training with weights and resist

## (undated) NOTE — MR AVS SNAPSHOT
[address]  [address] [place] [id number] [phone]               Thank you for choosing us for your health care visit with Brian Capellan MD.  We are glad to serve you and happy to provide you with this What changed: You were already taking a medication with the same name, and this prescription was added. Make sure you understand how and when to take each. Commonly known as:  ADDERALL   Start taking on:  3/27/2017           * Notice:   This list has 3 m

## (undated) NOTE — LETTER
12/06/18        [address]  [address] [place] [address]      Dear Milli Mcdaniels,    [address] [place] [address] records indicate that you have outstanding lab work and or testing that was ordered for you and has not yet been completed:  Orders Placed This Encounter

## (undated) NOTE — LETTER
Jana Her, :1970    CONSENT FOR PROCEDURE/SEDATION    1. I authorize the performance upon Jana Her  the following: Endometrial Biopsy    2.  I authorize Dr. Loras Severance, MD (and whomever is designated as the doctor’s assistant), to per Witness: _________________________________________ Date:___________     Physician Signature: _______________________________ Date:___________